# Patient Record
Sex: FEMALE | Race: WHITE | NOT HISPANIC OR LATINO | Employment: PART TIME | URBAN - METROPOLITAN AREA
[De-identification: names, ages, dates, MRNs, and addresses within clinical notes are randomized per-mention and may not be internally consistent; named-entity substitution may affect disease eponyms.]

---

## 2023-12-11 ENCOUNTER — HOSPITAL ENCOUNTER (EMERGENCY)
Facility: HOSPITAL | Age: 28
Discharge: HOME | End: 2023-12-11
Attending: EMERGENCY MEDICINE

## 2023-12-11 VITALS
OXYGEN SATURATION: 99 % | RESPIRATION RATE: 16 BRPM | WEIGHT: 120 LBS | DIASTOLIC BLOOD PRESSURE: 69 MMHG | TEMPERATURE: 98 F | SYSTOLIC BLOOD PRESSURE: 113 MMHG | BODY MASS INDEX: 23.56 KG/M2 | HEART RATE: 84 BPM | HEIGHT: 60 IN

## 2023-12-11 DIAGNOSIS — S80.12XA CONTUSION OF LEFT CALF, INITIAL ENCOUNTER: ICD-10-CM
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
## 2023-12-11 PROCEDURE — 99284 EMERGENCY DEPT VISIT MOD MDM: CPT

## 2023-12-11 RX ORDER — NAPROXEN 500 MG/1
500 TABLET ORAL 2 TIMES DAILY
Qty: 14 TABLET | Refills: 0 | Status: SHIPPED | OUTPATIENT
Start: 2023-12-11 | End: 2023-12-18

## 2023-12-11 RX ORDER — METHOCARBAMOL 500 MG/1
1000 TABLET, FILM COATED ORAL 3 TIMES DAILY
Qty: 42 TABLET | Refills: 0 | Status: SHIPPED | OUTPATIENT
Start: 2023-12-11 | End: 2023-12-18
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
## 2023-12-11 NOTE — DISCHARGE INSTRUCTIONS
RETURN TO EMERGENCY DEPARTMENT WITHOUT FAIL, IF YOUR SYMPTOMS WORSEN, IF YOU GET NEW OR DIFFERENT SYMPTOMS, IF YOU ARE UNABLE TO FOLLOW UP AS DIRECTED, OR IF YOU HAVE ANY CONCERNS OR WORRIES.    Take muscle relaxers and anti-inflammatories as directed.  Follow up with her primary care provider.

## 2024-03-04 ENCOUNTER — OFFICE VISIT (OUTPATIENT)
Dept: PSYCHIATRY | Facility: CLINIC | Age: 29
End: 2024-03-04
Payer: COMMERCIAL

## 2024-03-04 DIAGNOSIS — F31.32 BIPOLAR AFFECTIVE DISORDER, CURRENTLY DEPRESSED, MODERATE: ICD-10-CM

## 2024-03-04 DIAGNOSIS — F99 INSOMNIA DUE TO OTHER MENTAL DISORDER: ICD-10-CM

## 2024-03-04 DIAGNOSIS — F41.1 GAD (GENERALIZED ANXIETY DISORDER): Primary | ICD-10-CM

## 2024-03-04 DIAGNOSIS — F40.10 SOCIAL ANXIETY DISORDER: ICD-10-CM

## 2024-03-04 DIAGNOSIS — F43.21 GRIEF: ICD-10-CM

## 2024-03-04 DIAGNOSIS — F51.05 INSOMNIA DUE TO OTHER MENTAL DISORDER: ICD-10-CM

## 2024-03-04 PROCEDURE — G2211 COMPLEX E/M VISIT ADD ON: HCPCS | Mod: 95,,,

## 2024-03-04 PROCEDURE — 99215 OFFICE O/P EST HI 40 MIN: CPT | Mod: 95,,,

## 2024-03-04 RX ORDER — CLONAZEPAM 0.5 MG/1
0.5 TABLET ORAL DAILY
Qty: 30 TABLET | Refills: 0 | Status: SHIPPED | OUTPATIENT
Start: 2024-03-04 | End: 2024-04-08 | Stop reason: SDUPTHER

## 2024-03-04 NOTE — PROGRESS NOTES
"OUTPATIENT PSYCHIATRY FOLLOW UP VISIT    Encounter Date: 3/4/2024    Clinical Status of Patient:  Outpatient (Virtual)  The patient location is: [address] [place] [address]  The patient phone number is: [phone]   Visit type: Virtual visit with synchronous audio and video  Each patient to whom he or she provides medical services by telemedicine is:  (1) informed of the relationship between the practitioner and patient and the respective role of any other health care provider with respect to management of the patient; and (2) notified that he or she may decline to receive medical services by telemedicine and may withdraw from such care at any time.    Chief Complaint:  Asher Oliver is a 28 y.o. female who presents today for follow-up.  Met with patient.      HISTORY OF PRESENTING ILLNESS:  Asher Oliver is a 28 y.o. female with history of bipolar I disorder, ESTER, SAD, insomnia who presents for follow up appointment.      Plan at last appointment:  Continue escitalopram 20 mg daily for mood and anxiety, will begin taper to discontinuation after lamotrigine takes effect  Increase lamotrigine to 200 mg daily for mood stabilization  Referral previously placed for individual psychotherapy, patient is on waitlist    Psychotropic medication history:   Sertraline (ineffective)  Hydroxyzine (sedation)      INTERVAL HISTORY:    Pt reports her brother recently  by suicide. He was pronounced brain dead .She reports flashbacks and feelings of guilt, stating, "Why didn't I catch it" as the suicide occurred 1 week after her brother was released from behavioral hospitalization. She planned the  and spoke with REGINALDO about organ donation to save her parents the pain of doing these things. She reports after the organ donation and  she experienced daily panic attacks which have decreased in frequency to every few days currently.  She reports these panic attacks usually happen when she is home " "as her mind is kept busy at work and the attacks happen when her mind has time to wander.  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  She notes she has been having long periods of dissociation." She states, Before this my mood was stable. I wasn't having panic attacks, I had no issues sleeping."  She denies suicidal ideation, stating, I saw what my brother's suicide did to my parents and I could never do that to them." She requests p.r.n. medication she can take when experiencing a panic attack.  Hydroxyzine caused sedation in the past.    No interval episodes with symptoms consistent with connie or hypomania.  Denied interval or current suicidal/homicidal thoughts, intent, or plan or NSSI.  Denied other questions and concerns.    Medication side effects: None  Medication adherence: yes    PSYCHIATRIC REVIEW OF SYSTEMS:  Is patient experiencing or having changes in:  Trouble with sleep:  difficulty initiating and maintaining sleep, has to be exhausted or take nyquil   Appetite changes:  decreased  Weight changes:  has lost 4 lbs in the last month  Lack of energy:  fatigue  Anhedonia:  no  Somatic symptoms:  nausea  Anxiety/panic:  increased anxiety and panic attacks  Irritability: no  Guilty/hopeless:  +guilt, denies hopelessness  Concentration: no  Racing thoughts: no  Impulsive behaviors: no  Paranoia/AVH: no  Self-injurious behavior/risky behavior:  no  Any drugs:  no  Alcohol:  no    MEDICAL REVIEW OF SYSTEMS:   Pain: Denies any significant chronic or acute pain.  Constitutional: Denies fever or change in appetite.  Cardiovascular: Denies chest pain or exertional dyspnea.  Respiratory: Denies cough or orthopnea.   GI: Denies abdominal pain, N/V  Neurological: Denies tremor, seizure, or focal weakness.  Psychiatric: See HPI above.    PAST PSYCHIATRIC, MEDICAL, AND SOCIAL HISTORY REVIEWED  The patient's past medical, family and social history " have been reviewed and updated as appropriate within the electronic medical record - see encounter notes.    PAST MEDICAL HISTORY:   Past Medical History:   Diagnosis Date    Abnormal Pap smear of cervix     Allergy     Anxiety     Chronic back pain     Headache(784.0)     Spina bifida     Head trauma/Loss of consciousness: denies  Seizures: denies     PAST PSYCHIATRIC HISTORY:  Psychiatric Care (current & past): treatment via PCP in the past  Previous Psychiatric Diagnoses:  MDD, ESTER, SAD  Previous Psychiatric Hospitalizations: denies  Previous SI/HI:    during Covid, 1st few weeks on ICU; no attempts  Previous Suicide Attempts or NSSI: Cutting in adolescence   History of Psychotherapy: court ordered after parent's violent divorce  History of Violence: denies     FAMILY HISTORY:   Paternal: father bipolar and grandparents; etoh and opioid abuse;  suicide great grandfather   Maternal: mother bipolar disorder  and grandparents;  etoh and opioid abuse;    Siblings: brother hospitalized 2x for SI, ESTER, MDD,  by suicide 24     SOCIAL HISTORY:   Developmental/Childhood: younger childhood was good, at age 14 grandfather brain cancer and whole family fell apart; father RN in [place] during [place] he became an etohic and opioid addict; then mother zheng  Marital Status/Relationship Status: engaged to [facility], next year   Children: no, planning for next year  Resides/Housing Status: [place]   Occupation/Employment: RN - ICU at [employer]  Hobbies/Recreational Activities: reading, fantasy and sci fi books, going out with friends to festivals events, video games   Spirituality/Protestant: no  Education level: Associates degree    History: denies  Legal History: denies  Access to firearms: yes, for self defence, kept separate from ammo      SUBSTANCE USE HISTORY:  Caffeine: yes, trying to decrease use, tries to limit to 1 cup coffee and 1 energy drink daily  Tobacco: vape with nicotine  3%   Alcohol: occasionally  Other  "Substances: denies  Addiction/Rehab: denies    MEDICATIONS:    Current Outpatient Medications:     clonazePAM (KLONOPIN) 0.5 MG tablet, Take 1 tablet (0.5 mg total) by mouth once daily., Disp: 30 tablet, Rfl: 0    EScitalopram oxalate (LEXAPRO) 20 MG tablet, Take 1 tablet (20 mg total) by mouth once daily., Disp: 90 tablet, Rfl: 1    lamoTRIgine (LAMICTAL) 200 MG tablet, Take 1 tablet (200 mg total) by mouth once daily., Disp: 90 tablet, Rfl: 1    ALLERGIES:  Review of patient's allergies indicates:  No Known Allergies    EXAM:  Constitutional  Vitals:  Most recent vital signs were reviewed.   Last 3 sets of VS:      7/10/2023     3:10 PM 11/1/2023     9:03 AM 12/11/2023     3:54 PM   Vitals - 1 value per visit   SYSTOLIC 112 103 110   DIASTOLIC 64 66 68   Pulse  94 86   Temp   98.1 °F (36.7 °C)   Resp 16  16   SPO2   100 %   Weight (lb) 117.95 125.44 120   Weight (kg) 53.5 56.9 54.432   Height 5' 5" (1.651 m) 5' (1.524 m) 5' (1.524 m)   BMI (Calculated) 19.6 24.5 23.4   Pain Score Zero Zero       General:  unremarkable, age appropriate     Musculoskeletal  Muscle Strength/Tone:  No tremors appreciated   Gait & Station:  Unable to assess, Pt seated during virtual visit     Psychiatric  Speech:  no latency; no press   Mood & Affect:  anxious, depressed  congruent and tearful at times   Thought Process:  normal and logical   Associations:  intact   Thought Content:  normal, no suicidality, no homicidality, delusions, or paranoia   Insight:  intact   Judgement: behavior is adequate to circumstances   Orientation:  grossly intact   Memory: intact for content of interview   Language: grossly intact   Attention Span & Concentration:  Intact to content of interview   Fund of Knowledge:  Not tested     SUICIDE RISK ASSESSMENT:  Protective factors: age, gender, no prior attempts, no prior hospitalizations, no family h/o attempts, no ongoing substance abuse, no psychosis, engaged, denies SI/intent/plan, seeking treatment, " access to treatment, future oriented, good primary support  Risks:  History of MDD, ESTER, SAD, history of suicidal ideation, remote history of nonsuicidal self-injury in the form of cutting, access to firearms, brother's recent suicide  Patient is a low immediate and long-term risk considering risk factors    RELEVANT LABS/STUDIES:    Lab Results   Component Value Date    WBC 5.25 06/21/2023    HGB 14.6 06/21/2023    HCT 43.1 06/21/2023    MCV 89 06/21/2023     06/21/2023     BMP  Lab Results   Component Value Date     06/21/2023    K 4.8 06/21/2023     06/21/2023    CO2 24 06/21/2023    BUN 14 06/21/2023    CREATININE 0.7 06/21/2023    CALCIUM 9.6 06/21/2023    ANIONGAP 11 06/21/2023    ESTGFRAFRICA >60.0 07/24/2020    EGFRNONAA >60.0 07/24/2020     Lab Results   Component Value Date    ALT 39 06/21/2023     (H) 06/21/2023    ALKPHOS 76 06/21/2023    BILITOT 0.3 06/21/2023     Lab Results   Component Value Date    TSH 1.841 06/21/2023     Lab Results   Component Value Date    HGBA1C 4.7 06/21/2023     Lab Results   Component Value Date    CHOL 165 06/21/2023    TRIG 41 06/21/2023    HDL 66 06/21/2023    LDLCALC 90.8 06/21/2023    CHOLHDL 40.0 06/21/2023    TOTALCHOLEST 2.5 06/21/2023       IMPRESSION:    Asher Oliver is a 28 y.o. female with history of bipolar I disorder, ESTER, SAD, insomnia who presents for follow up appointment.    Status/Progress: Based on the examination today, the patient's problem(s) is/are improved and adequately but not ideally controlled.  New problems have not been presented today.   Co-morbidities are not complicating management of the primary condition.  There are no active rule-out diagnoses for this patient at this time.     Patient reports improvement in mood and anxiety after starting difficult titration of lamotrigine 1 month ago.  She is currently up to 100 mg daily.  Will continue typical titration.  Discussed taper of escitalopram as this is most  likely contributing to her symptoms of rapid cycling.  Patient verbalizes understanding.  We will begin taper next follow-up.    Patient reports worsening mood and marked anxiety with frequent panic attacks after her brother's death by suicide in January.  Discussed treatment options including augmenting with an agent such as aripiprazole.  Patient states she is leery of aripiprazole as this worsened her brother's depression.  She does not wish to start additional medication at this time would like to reassess at follow-up.  She does request p.r.n. medication for panic attacks    Risk Parameters:  Patient reports no suicidal ideation  Patient reports no homicidal ideation  Patient reports no self-injurious behavior  Patient reports no violent behavior    DIAGNOSES:    ICD-10-CM ICD-9-CM   1. ESTER (generalized anxiety disorder)  F41.1 300.02   2. Social anxiety disorder  F40.10 300.23   3. Insomnia due to other mental disorder  F51.05 300.9    F99 327.02   4. Bipolar affective disorder, currently depressed, moderate  F31.32 296.52   5. Grief  F43.21 309.0       PLAN:  Continue escitalopram 20 mg daily for mood and anxiety, (it was planned to begin taper to discontinuation after lamotrigine takes effect, however, with the patient's brother's recent death by suicide we will continue this medication at this time so as not to risk destabilization with medication changes)  Continue lamotrigine 200 mg daily for mood   Referral previously placed for individual psychotherapy, patient is on waitlist    RETURN TO CLINIC:   6 weeks      FELICITA Cronin, PMHNP-BC    50 minutes of total time spent on the encounter, which includes face to face time and non-face to face time preparing to see the patient (eg. review of tests), obtaining and/or reviewing separately obtained history, documenting clinical information in the electronic health record, independently interpreting results (not separately reported), and communicating  "results to the patient/family/caregiver, or care coordination (not separately reported).     Visit today included managing the longitudinal care of the patient due to the serious and/or complex managed problem(s) bipolar disorder, ESTER, social anxiety disorder, insomnia.    At this time there are no indications the patient represents an imminent danger to either themselves or others; will continue to manage treatment in the outpatient setting.    I discussed the patient's care with the patient including benefits, alternatives, possible adverse effects of the treatment plan; including the potential for metabolic complications, major organ dysfunction, black box warnings, and contraindications. The opportunity was given for questions/clarification, and after this discussion the above treatment plan was devised through shared decision making. The patient voiced their understanding of the diagnoses and treatments listed above and agreed to the treatment plan. Follow up plan was reviewed with the patient. The patient was advised to call to report any worsening of symptoms or problems with medication.    Supportive therapy and psychoeducation provided. Patient has been given crisis information including Suicide and Crisis Lifeline (call or text: 898). Patient also given instructions to go to the nearest ER or call 911 if unable to remain safe or if the Pt develops thoughts of harming self or others.    Documentation entered by me for this encounter may have been done in part using [employer] voice recognition transcription software. Garbled syntax, mangled pronouns, and other bizarre constructions may be attributed to that software system. Although I have made an effort to ensure accuracy, "sound like" errors may exist and should be interpreted in context.    "

## 2024-04-04 ENCOUNTER — TELEPHONE (OUTPATIENT)
Dept: PSYCHIATRY | Facility: CLINIC | Age: 29
End: 2024-04-04
Payer: COMMERCIAL

## 2024-04-08 ENCOUNTER — OFFICE VISIT (OUTPATIENT)
Dept: PSYCHIATRY | Facility: CLINIC | Age: 29
End: 2024-04-08
Payer: COMMERCIAL

## 2024-04-08 DIAGNOSIS — F40.10 SOCIAL ANXIETY DISORDER: ICD-10-CM

## 2024-04-08 DIAGNOSIS — F43.21 GRIEF: ICD-10-CM

## 2024-04-08 DIAGNOSIS — F41.1 GAD (GENERALIZED ANXIETY DISORDER): ICD-10-CM

## 2024-04-08 DIAGNOSIS — G47.00 INSOMNIA, UNSPECIFIED TYPE: ICD-10-CM

## 2024-04-08 DIAGNOSIS — F31.75 BIPOLAR DISORDER, IN PARTIAL REMISSION, MOST RECENT EPISODE DEPRESSED: Primary | ICD-10-CM

## 2024-04-08 PROCEDURE — 99215 OFFICE O/P EST HI 40 MIN: CPT | Mod: 95,,,

## 2024-04-08 PROCEDURE — 1160F RVW MEDS BY RX/DR IN RCRD: CPT | Mod: CPTII,95,,

## 2024-04-08 PROCEDURE — 1159F MED LIST DOCD IN RCRD: CPT | Mod: CPTII,95,,

## 2024-04-08 PROCEDURE — G2211 COMPLEX E/M VISIT ADD ON: HCPCS | Mod: 95,,,

## 2024-04-08 RX ORDER — CLONAZEPAM 0.5 MG/1
0.5 TABLET ORAL DAILY
Qty: 30 TABLET | Refills: 0 | Status: SHIPPED | OUTPATIENT
Start: 2024-04-08 | End: 2025-04-08

## 2024-04-08 RX ORDER — ESCITALOPRAM OXALATE 10 MG/1
15 TABLET ORAL DAILY
Qty: 135 TABLET | Refills: 0 | Status: SHIPPED | OUTPATIENT
Start: 2024-04-08 | End: 2024-07-07

## 2024-04-08 NOTE — PROGRESS NOTES
"OUTPATIENT PSYCHIATRY FOLLOW UP VISIT    Encounter Date: 2024    Clinical Status of Patient:  Outpatient (Virtual)  The patient location is: [address] [place] [address]  The patient phone number is: [phone]   Visit type: Virtual visit with synchronous audio and video  Each patient to whom he or she provides medical services by telemedicine is:  (1) informed of the relationship between the practitioner and patient and the respective role of any other health care provider with respect to management of the patient; and (2) notified that he or she may decline to receive medical services by telemedicine and may withdraw from such care at any time.    Chief Complaint:  Asher Oliver is a 28 y.o. female who presents today for follow-up.  Met with patient.      HISTORY OF PRESENTING ILLNESS:  Asher Oliver is a 28 y.o. female with history of bipolar I disorder, ESTER, SAD, insomnia who presents for follow up appointment.      3/4/2024: Pt reports her brother recently  by suicide. He was pronounced brain dead .She reports flashbacks and feelings of guilt, stating, "Why didn't I catch it" as the suicide occurred 1 week after her brother was released from behavioral hospitalization. She planned the  and spoke with REGINALDO about organ donation to save her parents the pain of doing these things. She reports after the organ donation and  she experienced daily panic attacks which have decreased in frequency to every few days currently.  She reports these panic attacks usually happen when she is home as her mind is kept busy at work and the attacks happen when her mind has time to wander.  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  She notes she has been having long periods of dissociation." She states, Before this my mood was stable. I wasn't having panic attacks, I had no issues sleeping."  She denies suicidal " "ideation, stating, I saw what my brother's suicide did to my parents and I could never do that to them." She requests p.r.n. medication she can take when experiencing a panic attack.  Hydroxyzine caused sedation in the past.    Plan at last appointment:  Continue escitalopram 20 mg daily for mood and anxiety, (it was planned to begin taper to discontinuation after lamotrigine takes effect, however, with the patient's brother's recent death by suicide we will continue this medication at this time so as not to risk destabilization with medication changes)  Continue lamotrigine 200 mg daily for mood   Start clonazepam 0.5 mg daily p.r.n. anxiety  Referral previously placed for individual psychotherapy, patient is on waitlist    Psychotropic medication history:   Sertraline (ineffective)  Hydroxyzine (sedation)      INTERVAL HISTORY:  Pt reports improvement in mood and states her mood has been stable. She reports, "Lamictal is wonderful." Anxiety has improved as well and frequency of panic attacks has decreased.  Patient reports she takes clonazepam p.r.n. and she does experience panic attacks and notes this relieves symptoms.  She notes it has been easier for her to go to work lately.  Her sleep has greatly improved.    No interval episodes with symptoms consistent with connie or hypomania.  Denied interval or current suicidal/homicidal thoughts, intent, or plan or NSSI.  Denied other questions and concerns.    Medication side effects: None  Medication adherence: yes    PSYCHIATRIC REVIEW OF SYSTEMS:  Is patient experiencing or having changes in:  Trouble with sleep:  sleeping much better, no longer waking up during the night  Appetite changes:  increased to baseline  Weight changes:   lost 5 lbs after her brother's death, no further weight loss  Lack of energy:  better, though some continued fatigue d/t working night shifts  Anhedonia:  no  Somatic symptoms:  no  Anxiety/panic: anxiety and panic attacks are well " controlled  Irritability: no  Guilty/hopeless:  some continued guilt, denies hopelessness  Concentration: no  Racing thoughts: no  Impulsive behaviors: no  Paranoia/AVH: no  Self-injurious behavior/risky behavior:  no  Any drugs:  no  Alcohol:  no    MEDICAL REVIEW OF SYSTEMS:   Pain: Denies any significant chronic or acute pain.  Constitutional: Denies fever or change in appetite.  Cardiovascular: Denies chest pain or exertional dyspnea.  Respiratory: Denies cough or orthopnea.   GI: Denies abdominal pain, N/V  Neurological: Denies tremor, seizure, or focal weakness.  Psychiatric: See HPI above.    PAST PSYCHIATRIC, MEDICAL, AND SOCIAL HISTORY REVIEWED  The patient's past medical, family and social history have been reviewed and updated as appropriate within the electronic medical record - see encounter notes.    PAST MEDICAL HISTORY:   Past Medical History:   Diagnosis Date    Abnormal Pap smear of cervix     Allergy     Anxiety     Chronic back pain     Headache(784.0)     Spina bifida     Head trauma/Loss of consciousness: denies  Seizures: denies     PAST PSYCHIATRIC HISTORY:  Psychiatric Care (current & past): treatment via PCP in the past  Previous Psychiatric Diagnoses:  MDD, ESTER, SAD  Previous Psychiatric Hospitalizations: denies  Previous SI/HI:    during Covid, 1st few weeks on ICU; no attempts  Previous Suicide Attempts or NSSI: Cutting in adolescence   History of Psychotherapy: court ordered after parent's violent divorce  History of Violence: denies     FAMILY HISTORY:   Paternal: father bipolar and grandparents; etoh and opioid abuse;  suicide great grandfather   Maternal: mother bipolar disorder  and grandparents;  etoh and opioid abuse;    Siblings: brother hospitalized 2x for SI, ESTER, MDD,  by suicide 24     SOCIAL HISTORY:   Developmental/Childhood: younger childhood was good, at age 14 grandfather brain cancer and whole family fell apart; father RN in [place] during [place] he became an  "etohic and opioid addict; then mother zheng  Marital Status/Relationship Status: engaged to BDF, next year   Children: no, planning for next year  Resides/Housing Status: [place]   Occupation/Employment: RN - ICU at [employer]  Hobbies/Recreational Activities: reading, fantasy and sci fi books, going out with friends to festivals events, video games   Spirituality/Anabaptism: no  Education level: Associates degree    History: denies  Legal History: denies  Access to firearms: yes, for self defence, kept separate from ammo      SUBSTANCE USE HISTORY:  Caffeine: yes, trying to decrease use, tries to limit to 1 cup coffee and 1 energy drink daily  Tobacco: vape with nicotine  3%   Alcohol: occasionally  Other Substances: denies  Addiction/Rehab: denies    MEDICATIONS:    Current Outpatient Medications:     clonazePAM (KLONOPIN) 0.5 MG tablet, Take 1 tablet (0.5 mg total) by mouth once daily., Disp: 30 tablet, Rfl: 0    EScitalopram oxalate (LEXAPRO) 10 MG tablet, Take 1.5 tablets (15 mg total) by mouth once daily., Disp: 135 tablet, Rfl: 0    lamoTRIgine (LAMICTAL) 200 MG tablet, Take 1 tablet (200 mg total) by mouth once daily., Disp: 90 tablet, Rfl: 1    ALLERGIES:  Review of patient's allergies indicates:  No Known Allergies    EXAM:  Constitutional  Vitals:  Most recent vital signs were reviewed.   Last 3 sets of VS:      7/10/2023     3:10 PM 11/1/2023     9:03 AM 12/11/2023     3:54 PM   Vitals - 1 value per visit   SYSTOLIC 112 103 110   DIASTOLIC 64 66 68   Pulse  94 86   Temp   98.1 °F (36.7 °C)   Resp 16  16   SPO2   100 %   Weight (lb) 117.95 125.44 120   Weight (kg) 53.5 56.9 54.432   Height 5' 5" (1.651 m) 5' (1.524 m) 5' (1.524 m)   BMI (Calculated) 19.6 24.5 23.4   Pain Score Zero Zero       General:  unremarkable, age appropriate     Musculoskeletal  Muscle Strength/Tone:  No tremors appreciated   Gait & Station:  Unable to assess, Pt seated during virtual visit     Psychiatric  Speech:  no latency; no " press   Mood & Affect:  sad  congruent and appropriate   Thought Process:  normal and logical   Associations:  intact   Thought Content:  normal, no suicidality, no homicidality, delusions, or paranoia   Insight:  intact   Judgement: behavior is adequate to circumstances   Orientation:  grossly intact   Memory: intact for content of interview   Language: grossly intact   Attention Span & Concentration:  Intact to content of interview   Fund of Knowledge:  Not tested     SUICIDE RISK ASSESSMENT:  Protective factors: age, gender, no prior attempts, no prior hospitalizations, no family h/o attempts, no ongoing substance abuse, no psychosis, engaged, denies SI/intent/plan, seeking treatment, access to treatment, future oriented, good primary support  Risks:  History of MDD, ESTER, SAD, history of suicidal ideation, remote history of nonsuicidal self-injury in the form of cutting, access to firearms, brother's recent suicide  Patient is a low immediate and long-term risk considering risk factors    RELEVANT LABS/STUDIES:    Lab Results   Component Value Date    WBC 5.25 06/21/2023    HGB 14.6 06/21/2023    HCT 43.1 06/21/2023    MCV 89 06/21/2023     06/21/2023     BMP  Lab Results   Component Value Date     06/21/2023    K 4.8 06/21/2023     06/21/2023    CO2 24 06/21/2023    BUN 14 06/21/2023    CREATININE 0.7 06/21/2023    CALCIUM 9.6 06/21/2023    ANIONGAP 11 06/21/2023    ESTGFRAFRICA >60.0 07/24/2020    EGFRNONAA >60.0 07/24/2020     Lab Results   Component Value Date    ALT 39 06/21/2023     (H) 06/21/2023    ALKPHOS 76 06/21/2023    BILITOT 0.3 06/21/2023     Lab Results   Component Value Date    TSH 1.841 06/21/2023     Lab Results   Component Value Date    HGBA1C 4.7 06/21/2023     Lab Results   Component Value Date    CHOL 165 06/21/2023    TRIG 41 06/21/2023    HDL 66 06/21/2023    LDLCALC 90.8 06/21/2023    CHOLHDL 40.0 06/21/2023    TOTALCHOLEST 2.5 06/21/2023       IMPRESSION:     Asher Oliver is a 28 y.o. female with history of bipolar I disorder, ESTER, SAD, insomnia who presents for follow up appointment.    Status/Progress: Based on the examination today, the patient's problem(s) is/are improved and adequately but not ideally controlled.  New problems have not been presented today.   Co-morbidities are not complicating management of the primary condition.  There are no active rule-out diagnoses for this patient at this time.     Patient reports improvement in mood and anxiety after starting difficult titration of lamotrigine 1 month ago.  She is currently up to 100 mg daily.  Will continue typical titration.  Discussed taper of escitalopram as this is most likely contributing to her symptoms of rapid cycling.  Patient verbalizes understanding.  We will begin taper next follow-up.    Patient reports worsening mood and marked anxiety with frequent panic attacks after her brother's death by suicide in January.  Discussed treatment options including augmenting with an agent such as aripiprazole.  Patient states she is leery of aripiprazole as this worsened her brother's depression.  She does not wish to start additional medication at this time would like to reassess at follow-up.  She does request p.r.n. medication for panic attacks    Risk Parameters:  Patient reports no suicidal ideation  Patient reports no homicidal ideation  Patient reports no self-injurious behavior  Patient reports no violent behavior    DIAGNOSES:    ICD-10-CM ICD-9-CM   1. Bipolar disorder, in partial remission, most recent episode depressed  F31.75 296.55   2. ESTER (generalized anxiety disorder)  F41.1 300.02   3. Social anxiety disorder  F40.10 300.23   4. Grief  F43.21 309.0   5. Insomnia, unspecified type  G47.00 780.52       PLAN:  Decrease escitalopram from 20 to 15 mg daily with plan to taper to discontinuation as antidepressants have been shown to cause rapid cycling in bipolar disorder  Continue  lamotrigine 200 mg daily for mood   Continue clonazepam 0.5 mg daily p.r.n. anxiety  Referral previously placed for individual psychotherapy, patient is on waitlist    RETURN TO CLINIC:   6 weeks      FELICITA Cronin, PMHNP-BC    40 minutes of total time spent on the encounter, which includes face to face time and non-face to face time preparing to see the patient (eg. review of tests), obtaining and/or reviewing separately obtained history, documenting clinical information in the electronic health record, independently interpreting results (not separately reported), and communicating results to the patient/family/caregiver, or care coordination (not separately reported).     Visit today included managing the longitudinal care of the patient due to the serious and/or complex managed problem(s) bipolar disorder, ESTER, social anxiety disorder, insomnia.    At this time there are no indications the patient represents an imminent danger to either themselves or others; will continue to manage treatment in the outpatient setting.    I discussed the patient's care with the patient including benefits, alternatives, possible adverse effects of the treatment plan; including the potential for metabolic complications, major organ dysfunction, black box warnings, and contraindications. The opportunity was given for questions/clarification, and after this discussion the above treatment plan was devised through shared decision making. The patient voiced their understanding of the diagnoses and treatments listed above and agreed to the treatment plan. Follow up plan was reviewed with the patient. The patient was advised to call to report any worsening of symptoms or problems with medication.    Supportive therapy and psychoeducation provided. Patient has been given crisis information including Suicide and Crisis Lifeline (call or text: 418). Patient also given instructions to go to the nearest ER or call 911 if unable to  "remain safe or if the Pt develops thoughts of harming self or others.    Documentation entered by me for this encounter may have been done in part using [employer] voice recognition transcription software. Garbled syntax, mangled pronouns, and other bizarre constructions may be attributed to that software system. Although I have made an effort to ensure accuracy, "sound like" errors may exist and should be interpreted in context.    "

## 2024-04-15 ENCOUNTER — PATIENT MESSAGE (OUTPATIENT)
Dept: PSYCHIATRY | Facility: CLINIC | Age: 29
End: 2024-04-15
Payer: COMMERCIAL

## 2024-05-11 ENCOUNTER — PATIENT MESSAGE (OUTPATIENT)
Dept: PSYCHIATRY | Facility: CLINIC | Age: 29
End: 2024-05-11
Payer: COMMERCIAL

## 2024-06-06 ENCOUNTER — TELEPHONE (OUTPATIENT)
Dept: FAMILY MEDICINE | Facility: CLINIC | Age: 29
End: 2024-06-06
Payer: COMMERCIAL

## 2024-06-06 ENCOUNTER — OFFICE VISIT (OUTPATIENT)
Dept: FAMILY MEDICINE | Facility: CLINIC | Age: 29
End: 2024-06-06
Payer: COMMERCIAL

## 2024-06-06 VITALS
OXYGEN SATURATION: 98 % | WEIGHT: 124.13 LBS | BODY MASS INDEX: 24.37 KG/M2 | DIASTOLIC BLOOD PRESSURE: 70 MMHG | HEIGHT: 60 IN | SYSTOLIC BLOOD PRESSURE: 100 MMHG | HEART RATE: 85 BPM | RESPIRATION RATE: 16 BRPM

## 2024-06-06 DIAGNOSIS — Z00.00 PHYSICAL EXAM: Primary | ICD-10-CM

## 2024-06-06 PROCEDURE — 99395 PREV VISIT EST AGE 18-39: CPT | Mod: S$GLB,,,

## 2024-06-06 PROCEDURE — 1160F RVW MEDS BY RX/DR IN RCRD: CPT | Mod: CPTII,S$GLB,,

## 2024-06-06 PROCEDURE — 3078F DIAST BP <80 MM HG: CPT | Mod: CPTII,S$GLB,,

## 2024-06-06 PROCEDURE — 3074F SYST BP LT 130 MM HG: CPT | Mod: CPTII,S$GLB,,

## 2024-06-06 PROCEDURE — 1159F MED LIST DOCD IN RCRD: CPT | Mod: CPTII,S$GLB,,

## 2024-06-06 PROCEDURE — 3008F BODY MASS INDEX DOCD: CPT | Mod: CPTII,S$GLB,,

## 2024-06-06 PROCEDURE — 99999 PR PBB SHADOW E&M-EST. PATIENT-LVL IV: CPT | Mod: PBBFAC,,,

## 2024-06-06 NOTE — TELEPHONE ENCOUNTER
Spoke with patient & made aware that Dr. Chaudhary hasn't seen her since 2020. We would need an appointment to place orders. She is needing TB test for work before Monday (soonest appt w/Dr. Chaudhary). Appt made w/BRADEN to address need. States she will schedule annual w/Dr. Chaudhary soon for annual.

## 2024-06-06 NOTE — TELEPHONE ENCOUNTER
----- Message from Miguel Meyer sent at 6/6/2024  7:05 AM CDT -----  Regarding: Lab Request  Caller is requesting to schedule their Lab appointment prior to annual appointment.  Order is not listed in EPIC.  Please enter order and contact patient to schedule.    Name of Caller:Pt    Preferred Date and Time of Labs:asap    Where would they like the lab performed?[facility]    Would the patient rather a call back or a response via [facility]? Call back    Best Call Back Number:[phone]      Additional Information:Sts she needs to get the TB Gold lab also wants a call back about her vaccination records.     Please advise -- Thank you

## 2024-06-07 ENCOUNTER — LAB VISIT (OUTPATIENT)
Dept: LAB | Facility: HOSPITAL | Age: 29
End: 2024-06-07
Payer: COMMERCIAL

## 2024-06-07 DIAGNOSIS — Z00.00 PHYSICAL EXAM: ICD-10-CM

## 2024-06-07 PROCEDURE — 86480 TB TEST CELL IMMUN MEASURE: CPT

## 2024-06-10 ENCOUNTER — PATIENT MESSAGE (OUTPATIENT)
Dept: FAMILY MEDICINE | Facility: CLINIC | Age: 29
End: 2024-06-10
Payer: COMMERCIAL

## 2024-06-10 LAB
GAMMA INTERFERON BACKGROUND BLD IA-ACNC: 0.06 IU/ML
M TB IFN-G CD4+ BCKGRND COR BLD-ACNC: 0.01 IU/ML
M TB IFN-G CD4+ BCKGRND COR BLD-ACNC: 0.04 IU/ML
MITOGEN IGNF BCKGRD COR BLD-ACNC: 9.94 IU/ML
TB GOLD PLUS: NEGATIVE

## 2024-07-11 DIAGNOSIS — F31.71 BIPOLAR DISORDER, IN PARTIAL REMISSION, MOST RECENT EPISODE HYPOMANIC: ICD-10-CM

## 2024-07-11 RX ORDER — LAMOTRIGINE 200 MG/1
200 TABLET ORAL
Qty: 30 TABLET | Refills: 0 | Status: SHIPPED | OUTPATIENT
Start: 2024-07-11 | End: 2024-07-12

## 2024-07-12 RX ORDER — LAMOTRIGINE 200 MG/1
200 TABLET ORAL DAILY
Qty: 30 TABLET | Refills: 0 | Status: SHIPPED | OUTPATIENT
Start: 2024-07-12

## 2024-08-06 ENCOUNTER — OFFICE VISIT (OUTPATIENT)
Dept: FAMILY MEDICINE | Facility: CLINIC | Age: 29
End: 2024-08-06
Payer: COMMERCIAL

## 2024-08-06 ENCOUNTER — HOSPITAL ENCOUNTER (OUTPATIENT)
Dept: RADIOLOGY | Facility: CLINIC | Age: 29
Discharge: HOME | End: 2024-08-06
Payer: COMMERCIAL

## 2024-08-06 VITALS
DIASTOLIC BLOOD PRESSURE: 60 MMHG | OXYGEN SATURATION: 95 % | HEART RATE: 97 BPM | HEIGHT: 61 IN | SYSTOLIC BLOOD PRESSURE: 110 MMHG | WEIGHT: 129.19 LBS | BODY MASS INDEX: 24.39 KG/M2 | TEMPERATURE: 98 F

## 2024-08-06 DIAGNOSIS — M25.562 ACUTE PAIN OF LEFT KNEE: Primary | ICD-10-CM

## 2024-08-06 DIAGNOSIS — M25.562 ACUTE PAIN OF LEFT KNEE: ICD-10-CM

## 2024-08-06 PROCEDURE — 99999 PR PBB SHADOW E&M-EST. PATIENT-LVL V: CPT | Mod: PBBFAC,,,

## 2024-08-06 PROCEDURE — 1159F MED LIST DOCD IN RCRD: CPT | Mod: CPTII,S$GLB,,

## 2024-08-06 PROCEDURE — 3008F BODY MASS INDEX DOCD: CPT | Mod: CPTII,S$GLB,,

## 2024-08-06 PROCEDURE — 3078F DIAST BP <80 MM HG: CPT | Mod: CPTII,S$GLB,,

## 2024-08-06 PROCEDURE — 73562 X-RAY EXAM OF KNEE 3: CPT | Mod: TC,FY,PO,LT

## 2024-08-06 PROCEDURE — 73562 X-RAY EXAM OF KNEE 3: CPT | Mod: 26,LT,S$GLB, | Performed by: RADIOLOGY

## 2024-08-06 PROCEDURE — 99213 OFFICE O/P EST LOW 20 MIN: CPT | Mod: S$GLB,,,

## 2024-08-06 PROCEDURE — 1160F RVW MEDS BY RX/DR IN RCRD: CPT | Mod: CPTII,S$GLB,,

## 2024-08-06 PROCEDURE — 3074F SYST BP LT 130 MM HG: CPT | Mod: CPTII,S$GLB,,

## 2024-08-07 ENCOUNTER — OFFICE VISIT (OUTPATIENT)
Dept: ORTHOPEDICS | Facility: CLINIC | Age: 29
End: 2024-08-07
Payer: COMMERCIAL

## 2024-08-07 ENCOUNTER — TELEPHONE (OUTPATIENT)
Dept: FAMILY MEDICINE | Facility: CLINIC | Age: 29
End: 2024-08-07
Payer: COMMERCIAL

## 2024-08-07 VITALS — WEIGHT: 129 LBS | BODY MASS INDEX: 24.35 KG/M2 | HEIGHT: 61 IN

## 2024-08-07 DIAGNOSIS — M25.562 ACUTE PAIN OF LEFT KNEE: ICD-10-CM

## 2024-08-07 PROCEDURE — 1160F RVW MEDS BY RX/DR IN RCRD: CPT | Mod: CPTII,S$GLB,, | Performed by: ORTHOPAEDIC SURGERY

## 2024-08-07 PROCEDURE — 1159F MED LIST DOCD IN RCRD: CPT | Mod: CPTII,S$GLB,, | Performed by: ORTHOPAEDIC SURGERY

## 2024-08-07 PROCEDURE — 3008F BODY MASS INDEX DOCD: CPT | Mod: CPTII,S$GLB,, | Performed by: ORTHOPAEDIC SURGERY

## 2024-08-07 PROCEDURE — 99999 PR PBB SHADOW E&M-EST. PATIENT-LVL III: CPT | Mod: PBBFAC,,, | Performed by: ORTHOPAEDIC SURGERY

## 2024-08-07 PROCEDURE — 99205 OFFICE O/P NEW HI 60 MIN: CPT | Mod: S$GLB,,, | Performed by: ORTHOPAEDIC SURGERY

## 2024-09-12 DIAGNOSIS — F31.71 BIPOLAR DISORDER, IN PARTIAL REMISSION, MOST RECENT EPISODE HYPOMANIC: ICD-10-CM

## 2024-09-12 DIAGNOSIS — F41.1 GAD (GENERALIZED ANXIETY DISORDER): ICD-10-CM

## 2024-09-12 DIAGNOSIS — F40.10 SOCIAL ANXIETY DISORDER: ICD-10-CM

## 2024-09-13 RX ORDER — LAMOTRIGINE 200 MG/1
200 TABLET ORAL DAILY
Qty: 30 TABLET | Refills: 0 | Status: SHIPPED | OUTPATIENT
Start: 2024-09-13

## 2024-09-13 RX ORDER — ESCITALOPRAM OXALATE 20 MG/1
20 TABLET ORAL DAILY
Qty: 30 TABLET | Refills: 0 | Status: SHIPPED | OUTPATIENT
Start: 2024-09-13 | End: 2024-12-12

## 2024-09-13 NOTE — TELEPHONE ENCOUNTER
LOV: 04/08/2024  NOV: RETURN TO CLINIC:   6 weeks   LDO: Lexapro 10 MG     Patient Comment: I need this dosage increased to one 20mg tablet daily     LOV: 04/08/2024  NOV: RETURN TO CLINIC:   6 weeks   LDO: Lamictal 200 MG

## 2024-10-23 DIAGNOSIS — F41.1 GAD (GENERALIZED ANXIETY DISORDER): ICD-10-CM

## 2024-10-23 DIAGNOSIS — F31.71 BIPOLAR DISORDER, IN PARTIAL REMISSION, MOST RECENT EPISODE HYPOMANIC: ICD-10-CM

## 2024-10-23 DIAGNOSIS — F40.10 SOCIAL ANXIETY DISORDER: ICD-10-CM

## 2024-10-28 RX ORDER — ESCITALOPRAM OXALATE 20 MG/1
20 TABLET ORAL
Qty: 30 TABLET | Refills: 0 | Status: SHIPPED | OUTPATIENT
Start: 2024-10-28

## 2024-10-28 RX ORDER — LAMOTRIGINE 200 MG/1
200 TABLET ORAL
Qty: 30 TABLET | Refills: 0 | Status: SHIPPED | OUTPATIENT
Start: 2024-10-28

## 2024-11-06 ENCOUNTER — OFFICE VISIT (OUTPATIENT)
Dept: PSYCHIATRY | Facility: CLINIC | Age: 29
End: 2024-11-06
Payer: COMMERCIAL

## 2024-11-06 DIAGNOSIS — F31.70 BIPOLAR AFFECTIVE DISORDER IN REMISSION: Primary | ICD-10-CM

## 2024-11-06 DIAGNOSIS — F41.1 GAD (GENERALIZED ANXIETY DISORDER): ICD-10-CM

## 2024-11-06 DIAGNOSIS — F40.10 SOCIAL ANXIETY DISORDER: ICD-10-CM

## 2024-11-06 PROCEDURE — 1160F RVW MEDS BY RX/DR IN RCRD: CPT | Mod: CPTII,95,,

## 2024-11-06 PROCEDURE — 99214 OFFICE O/P EST MOD 30 MIN: CPT | Mod: 95,,,

## 2024-11-06 PROCEDURE — G2211 COMPLEX E/M VISIT ADD ON: HCPCS | Mod: 95,,,

## 2024-11-06 PROCEDURE — 1159F MED LIST DOCD IN RCRD: CPT | Mod: CPTII,95,,

## 2024-11-06 RX ORDER — LAMOTRIGINE 200 MG/1
200 TABLET ORAL DAILY
Qty: 90 TABLET | Refills: 0 | Status: SHIPPED | OUTPATIENT
Start: 2024-11-06

## 2024-11-06 RX ORDER — ESCITALOPRAM OXALATE 20 MG/1
20 TABLET ORAL DAILY
Qty: 90 TABLET | Refills: 0 | Status: SHIPPED | OUTPATIENT
Start: 2024-11-06

## 2024-11-06 NOTE — PROGRESS NOTES
OUTPATIENT PSYCHIATRY FOLLOW UP VISIT    Encounter Date: 11/6/2024    Clinical Status of Patient:  Outpatient (Virtual)  The patient location is: [address] [place] [address]  The patient phone number is: [phone]   Visit type: Virtual visit with synchronous audio and video  Each patient to whom he or she provides medical services by telemedicine is:  (1) informed of the relationship between the practitioner and patient and the respective role of any other health care provider with respect to management of the patient; and (2) notified that he or she may decline to receive medical services by telemedicine and may withdraw from such care at any time.    Chief Complaint:  Asher Saldana is a 29 y.o. female who presents today for follow-up.  Met with patient.      HISTORY OF PRESENTING ILLNESS:  Asher Saldana is a 29 y.o. female with history of bipolar I disorder, ESTER, SAD, insomnia who presents for follow up appointment.      Plan at last appointment:  Decrease escitalopram from 20 to 15 mg daily with plan to taper to discontinuation as antidepressants have been shown to cause rapid cycling in bipolar disorder  Continue lamotrigine 200 mg daily for mood   Continue clonazepam 0.5 mg daily p.r.n. anxiety  Referral previously placed for individual psychotherapy, patient is on waitlist    Psychotropic medication history:   Sertraline (ineffective)  Hydroxyzine (sedation)      INTERVAL HISTORY:    Escitalopram was decreased from 20-15 mg daily at last visit, however, shortly after the decrease in dose patient experienced flashbacks and a marked increase in anxiety so escitalopram was increased to original dose of 20 mg daily.    Today, Pt tells me she is doing well overall. Her mood has been stable.     Generalized anxiety is well controlled. She does report she has had approximately one panic attack per month. Clonazepam 0.5 mg aborts these attacks.    Is patient experiencing or having  "changes in:  Trouble with sleep:  sleeping well, recently changed to day shift which allows for a more normal sleep cycle  Appetite changes:  "up and down. Some days I eat normally and other days I only have coffee."  Weight changes:  5 lb gain since January  Lack of energy:  no  Anhedonia:  no  Somatic symptoms:  no  Irritability: no  Guilty/hopeless: no  Concentration: no  Racing thoughts: no  Impulsive behaviors: no  Paranoia/AVH: no  Self-injurious behavior/risky behavior:  no  Any drugs:  no  Alcohol:  no    No interval episodes with symptoms consistent with connie or hypomania.  Denied interval or current suicidal/homicidal thoughts, intent, or plan or NSSI.  Denied other questions and concerns.    Medication side effects: None  Medication adherence: yes    MEDICAL REVIEW OF SYSTEMS:   Pain: Denies any significant chronic or acute pain.  Constitutional: Denies fever or change in appetite.  Cardiovascular: Denies chest pain or exertional dyspnea.  Respiratory: Denies cough or orthopnea.   GI: Denies abdominal pain, N/V  Neurological: Denies tremor, seizure, or focal weakness.  Psychiatric: See HPI above.    PAST PSYCHIATRIC, MEDICAL, AND SOCIAL HISTORY REVIEWED  The patient's past medical, family and social history have been reviewed and updated as appropriate within the electronic medical record - see encounter notes.    PAST MEDICAL HISTORY:   Past Medical History:   Diagnosis Date    Abnormal Pap smear of cervix     Allergy     Anxiety     Chronic back pain     Headache(784.0)     Spina bifida     Head trauma/Loss of consciousness: denies  Seizures: denies     PAST PSYCHIATRIC HISTORY:  Psychiatric Care (current & past): treatment via PCP in the past  Previous Psychiatric Diagnoses:  MDD, ESTER, SAD  Previous Psychiatric Hospitalizations: denies  Previous SI/HI:    during Covid, 1st few weeks on ICU; no attempts  Previous Suicide Attempts or NSSI: Cutting in adolescence   History of Psychotherapy: court ordered " "after parent's violent divorce  History of Violence: denies     FAMILY HISTORY:   Paternal: father bipolar and grandparents; etoh and opioid abuse;  suicide great grandfather   Maternal: mother bipolar disorder  and grandparents;  etoh and opioid abuse;    Siblings: brother hospitalized 2x for SI, ESTER, MDD,  by suicide 24     SOCIAL HISTORY:   Developmental/Childhood: younger childhood was good, at age 14 grandfather brain cancer and whole family fell apart; father RN in [place] during [place] he became an etohic and opioid addict; then mother zheng  Marital Status/Relationship Status: engaged to BD, next year   Children: no, planning for next year  Resides/Housing Status: [place]   Occupation/Employment: RN - ICU at [employer]  Hobbies/Recreational Activities: reading, fantasy and sci fi books, going out with friends to festivals events, video games   Spirituality/Anabaptism: no  Education level: Associates degree    History: denies  Legal History: denies  Access to firearms: yes, for self defence, kept separate from ammo      SUBSTANCE USE HISTORY:  Caffeine: yes, trying to decrease use, tries to limit to 1 cup coffee and 1 energy drink daily  Tobacco: vape with nicotine  3%   Alcohol: occasionally  Other Substances: denies  Addiction/Rehab: denies    EXAM:  Constitutional  Vitals:  Most recent vital signs were reviewed.   Last 3 sets of VS:      2024     2:57 PM 2024    11:22 AM 2024    10:29 AM   Vitals - 1 value per visit   SYSTOLIC 100 110    DIASTOLIC 70 60    Pulse 85 97    Temp  98.1 °F (36.7 °C)    Resp 16     SPO2 98 % 95 %    Weight (lb) 124.12 129.19 129   Weight (kg) 56.3 58.6 58.514   Height 5' (1.524 m) 5' 1" (1.549 m) 5' 1" (1.549 m)   BMI (Calculated) 24.2 24.4 24.4   Pain Score Zero Five Four      General:  unremarkable, age appropriate     Musculoskeletal  Muscle Strength/Tone:  No tremors appreciated   Gait & Station:  Pt seated during virtual visit     Psychiatric  Speech:  no " latency; no press   Mood & Affect:  euthymic  congruent and appropriate   Thought Process:  normal and logical   Associations:  intact   Thought Content:  normal, no suicidality, no homicidality, delusions, or paranoia   Insight:  intact   Judgement: behavior is adequate to circumstances   Orientation:  grossly intact   Memory: intact for content of interview   Language: grossly intact   Attention Span & Concentration:  Intact to content of interview   Fund of Knowledge:  Not tested     SUICIDE RISK ASSESSMENT:  Protective factors: age, gender, no prior attempts, no prior hospitalizations, no family h/o attempts, no ongoing substance abuse, no psychosis, engaged, denies SI/intent/plan, seeking treatment, access to treatment, future oriented, good primary support  Risks:  History of MDD, ESTER, SAD, history of suicidal ideation, remote history of nonsuicidal self-injury in the form of cutting, access to firearms, brother's suicide  Patient is a low immediate and long-term risk considering risk factors    RELEVANT LABS/STUDIES:    Lab Results   Component Value Date    WBC 5.25 06/21/2023    HGB 14.6 06/21/2023    HCT 43.1 06/21/2023    MCV 89 06/21/2023     06/21/2023     BMP  Lab Results   Component Value Date     06/21/2023    K 4.8 06/21/2023     06/21/2023    CO2 24 06/21/2023    BUN 14 06/21/2023    CREATININE 0.7 06/21/2023    CALCIUM 9.6 06/21/2023    ANIONGAP 11 06/21/2023    ESTGFRAFRICA >60.0 07/24/2020    EGFRNONAA >60.0 07/24/2020     Lab Results   Component Value Date    ALT 39 06/21/2023     (H) 06/21/2023    ALKPHOS 76 06/21/2023    BILITOT 0.3 06/21/2023     Lab Results   Component Value Date    TSH 1.841 06/21/2023     Lab Results   Component Value Date    HGBA1C 4.7 06/21/2023     Lab Results   Component Value Date    CHOL 165 06/21/2023    TRIG 41 06/21/2023    HDL 66 06/21/2023    LDLCALC 90.8 06/21/2023    CHOLHDL 40.0 06/21/2023    TOTALCHOLEST 2.5 06/21/2023        IMPRESSION:    Asher Saldana is a 29 y.o. female with history of bipolar I disorder, ESTER, SAD, insomnia who presents for follow up appointment.    Status/Progress: Based on the examination today, the patient's problem(s) is/are well controlled.  New problems have not been presented today.   Co-morbidities are not complicating management of the primary condition.  There are no active rule-out diagnoses for this patient at this time.     Patient reports improvement in mood and anxiety after starting difficult titration of lamotrigine 1 month ago.  She is currently up to 100 mg daily.  Will continue typical titration.  Discussed taper of escitalopram as this is most likely contributing to her symptoms of rapid cycling.  Patient verbalizes understanding.  We will begin taper next follow-up.    Patient reports worsening mood and marked anxiety with frequent panic attacks after her brother's death by suicide in January.  Discussed treatment options including augmenting with an agent such as aripiprazole.  Patient states she is leery of aripiprazole as this worsened her brother's depression.  She does not wish to start additional medication at this time would like to reassess at follow-up.  She does request p.r.n. medication for panic attacks    Risk Parameters:  Patient reports no suicidal ideation  Patient reports no homicidal ideation  Patient reports no self-injurious behavior  Patient reports no violent behavior    DIAGNOSES:    ICD-10-CM ICD-9-CM   1. Bipolar affective disorder in remission  F31.70 296.80   2. ESTER (generalized anxiety disorder)  F41.1 300.02   3. Social anxiety disorder  F40.10 300.23       PLAN:  Continue escitalopram 20 mg daily for mood and anxiety  Continue lamotrigine 200 mg daily for mood   Continue clonazepam 0.5 mg daily p.r.n. anxiety  Referral previously placed for individual psychotherapy, patient is on waitlist    RETURN TO CLINIC:   3 months      FELICITA Cronin,  PMHNP-BC    35 minutes of total time spent on the encounter, which includes face to face time and non-face to face time preparing to see the patient (eg. review of tests), obtaining and/or reviewing separately obtained history, documenting clinical information in the electronic health record, independently interpreting results (not separately reported), and communicating results to the patient/family/caregiver, or care coordination (not separately reported).     Visit today included managing the longitudinal care of the patient due to the serious and/or complex managed problem(s) bipolar disorder, ESTER, social anxiety disorder, insomnia.    At this time there are no indications the patient represents an imminent danger to either themselves or others; will continue to manage treatment in the outpatient setting.    I discussed the patient's care with the patient including benefits, alternatives, possible adverse effects of the treatment plan; including the potential for metabolic complications, major organ dysfunction, black box warnings, and contraindications. The opportunity was given for questions/clarification, and after this discussion the above treatment plan was devised through shared decision making. The patient voiced their understanding of the diagnoses and treatments listed above and agreed to the treatment plan. Follow up plan was reviewed with the patient. The patient was advised to call to report any worsening of symptoms or problems with medication.    Supportive therapy and psychoeducation provided. Patient has been given crisis information including Suicide and Crisis Lifeline (call or text: 296). Patient also given instructions to go to the nearest ER or call 911 if unable to remain safe or if the Pt develops thoughts of harming self or others.    Documentation entered by me for this encounter may have been done in part using [employer] Fluency Direct voice recognition transcription software. Sydnee  "syntax, mangled pronouns, and other bizarre constructions may be attributed to that software system. Although I have made an effort to ensure accuracy, "sound like" errors may exist and should be interpreted in context.  "

## 2024-12-02 ENCOUNTER — TELEPHONE (OUTPATIENT)
Dept: OBSTETRICS AND GYNECOLOGY | Facility: CLINIC | Age: 29
End: 2024-12-02
Payer: COMMERCIAL

## 2024-12-02 NOTE — TELEPHONE ENCOUNTER
TC/w pt. Regarding scheduling an appt, per pt's request. Successfully scheduled appt with KAMRON Rojo, on  12/6/2024 at 3:30 pm. Reason for visit: Pregnancy Confirmation. Pt voiced understanding.

## 2024-12-06 ENCOUNTER — OFFICE VISIT (OUTPATIENT)
Dept: OBSTETRICS AND GYNECOLOGY | Facility: CLINIC | Age: 29
End: 2024-12-06
Payer: COMMERCIAL

## 2024-12-06 VITALS
DIASTOLIC BLOOD PRESSURE: 70 MMHG | BODY MASS INDEX: 24.58 KG/M2 | SYSTOLIC BLOOD PRESSURE: 110 MMHG | WEIGHT: 130.06 LBS

## 2024-12-06 DIAGNOSIS — Z32.00 ENCOUNTER FOR CONFIRMATION OF PREGNANCY TEST RESULT WITH PHYSICAL EXAMINATION: Primary | ICD-10-CM

## 2024-12-06 PROCEDURE — 99999 PR PBB SHADOW E&M-EST. PATIENT-LVL III: CPT | Mod: PBBFAC,,, | Performed by: FAMILY MEDICINE

## 2024-12-06 PROCEDURE — 1159F MED LIST DOCD IN RCRD: CPT | Mod: CPTII,S$GLB,, | Performed by: FAMILY MEDICINE

## 2024-12-06 PROCEDURE — 87086 URINE CULTURE/COLONY COUNT: CPT | Performed by: FAMILY MEDICINE

## 2024-12-06 PROCEDURE — 3078F DIAST BP <80 MM HG: CPT | Mod: CPTII,S$GLB,, | Performed by: FAMILY MEDICINE

## 2024-12-06 PROCEDURE — 99213 OFFICE O/P EST LOW 20 MIN: CPT | Mod: S$GLB,,, | Performed by: FAMILY MEDICINE

## 2024-12-06 PROCEDURE — 1160F RVW MEDS BY RX/DR IN RCRD: CPT | Mod: CPTII,S$GLB,, | Performed by: FAMILY MEDICINE

## 2024-12-06 PROCEDURE — 3008F BODY MASS INDEX DOCD: CPT | Mod: CPTII,S$GLB,, | Performed by: FAMILY MEDICINE

## 2024-12-06 PROCEDURE — 3074F SYST BP LT 130 MM HG: CPT | Mod: CPTII,S$GLB,, | Performed by: FAMILY MEDICINE

## 2024-12-06 NOTE — PROGRESS NOTES
Asher Saldana  29 y.o.  MRN  [id number] PATIENT   1995   FINAL EDC:   ___   by ___    Baby: ___    SO Name: Avery ALLERGIES:    NKDA      GBS: ___  Date: ___ OB PROBLEM LIST:       TO-DO THROUGHOUT PREGNANCY:  Depression Screen: ___  Circumcision: ___  Rhogam: ___  Flu Shot: ___  TDAP: ___  Infant feeding: ___   Plans for epidural: ___  Classes at hospital: ___  PP contraception: ___  Delivery Consent: ___  TOLAC counseling: ___  BTL counseling: ___  Pediatrician: ___ MEDICATIONS:    Lamictal  Lexapro  PNV   TODAY'S PROGRESS NOTE    2024    OB INTAKE APPOINTMENT  Asher Saldana is a 29 y.o. who presents today for her OB Intake Appointment.    She denies any problems so far.      PREGNANCY DATING:    Pregnancy test today = positive  LMP 10/18/24 ---gives EDC---> 25 ----> 7+0 wks EGA today    Sure LMP: Yes  Prior US done: No  Other information relevant to dating (sure conception date, IVF date, etc.): No     CARRIER SCREENING PREVIOUSLY DONE:  Cystic Fibrosis, Spinal Muscular Atrophy, Fragile X:  Not previously tested  Hemoglobinopathies: Not previously tested     FAMILY LINEAGE AND HISTORY:  Ashkenazi or North American Religion:  No  Intellectual disability:  Yes - brother has Asperger's   Premature ovarian failure (<40yoa):  Yes - Mom went into menopause at 38  Cajun or Jamaican [place]:  No    MISCELLANEOUS:  Does the patient have cats? No    MEDICATIONS:  Current Outpatient Medications   Medication Instructions    EScitalopram oxalate (LEXAPRO) 20 mg, Oral, Daily    lamoTRIgine (LAMICTAL) 200 mg, Oral, Daily       --------------------------------------------------------------------------------     ASSESMENT & PLAN:  Pregnancy confirmed today with a positive pregnancy test.  Patient's medical history was obtained today.    A first trimester dating ultrasound was ordered and scheduled (ideally done between 8 and 10wks).  Gave patient our OB Welcome Packet and reviewed  "its contents.  Our discussion included:  an overview of appointments, hydration / nutrition / weight gain advice, safe OTC medications, what substances and exposures to avoid, vaccine recommendations, advice for morning sickness, dental hygiene advice, recommendations regarding exercise, advice for travel in pregnancy, information about breastfeeding, postpartum birth control, and circumcision, pediatricians in the community, WIC enrollment, how to contact us for concerns or problems during pregnancy, warning or danger signs.  Also provided [facility]'s publication, "Your Pregnancy from A-Z."    Advised patient to enroll in [employer] if not already done.    Medication management:.  Advised patient to start a prenatal vitamin if not already taking.  It should contain 600mcg folic acid, 27mg iron, and 200mg DHA.     Genetic and Carrier Screening options were discussed in detail with the patient.  Once her EDC is confirmed, she may go to [facility] for the test(s) if desired @ 9wks or greater.  She was encouraged to review the detailed information available in the OB Welcome Packet.    The patient was directed to the lab for  Routine OB labs    PAP smear: up to date    SAB precautions reviewed    Timing of next clinic appointment will be determined by dating ultrasound.  Will send patient a message in [employer].    Rosa Rojo, KAMRON     LABS   INITIAL LABS:    Use LNOB (for Epic auto-fill)  Use LLWOBLABS (for manual entry) OTHER LABS:    Use L28W (for Epic auto-fill)  Use LLWOBLABS (for manual entry)   ULTRASOUNDS   ANATOMY SCAN:    Use LLWOANATOMYSCAN      HISTORY   MEDICAL HISTORY:    Pre-pregnancy BMI = 24  Depression/anxiety  PTSD SURGICAL HISTORY:    none       OBSTETRIC HISTORY   Month/Year Mode of Delivery EGA Wt. M/F Epidural Complications / Comments   G1                                               SOCIAL HISTORY:    Smoker: non-smoker  Alcohol: yes but not since +HCG  Drugs: denies  Relationship: "   Domestic Violence: no  Lives with:   Education Level: Undergraduate Degree  Occupation:  [employer]  Sikh: [employer] [facility] HISTORY:    PAP'S: h/o abnormal & still being followed   LAST PAP: ASCUS / HPV POS (-16/-18) / Date: 6/26/2023  STD Hx: no past history  GENITAL HSV: no FAMILY HISTORY:    HTN: yes (dad)  DIABETES: no  BLEEDING D/O: no  CLOTTING D/O: no  BIRTH DEFECTS: no  MENTAL DISABILITY: no  GYN CANCER: no       Follow up for dating ultrasound around 9 wk EGA, 1st OB around 10-12 wk EGA.   Future Appointments   Date Time Provider Department Center   12/20/2024  2:00 PM ULTRASOUND, [facility] [place] [facility]   1/6/2025  3:00 PM Sabrina Clark MD [facility] [place] [facility]

## 2024-12-07 ENCOUNTER — LAB VISIT (OUTPATIENT)
Dept: LAB | Facility: HOSPITAL | Age: 29
End: 2024-12-07
Attending: FAMILY MEDICINE
Payer: COMMERCIAL

## 2024-12-07 DIAGNOSIS — Z32.00 ENCOUNTER FOR CONFIRMATION OF PREGNANCY TEST RESULT WITH PHYSICAL EXAMINATION: ICD-10-CM

## 2024-12-07 LAB
ABO + RH BLD: NORMAL
BASOPHILS # BLD AUTO: 0.02 K/UL (ref 0–0.2)
BASOPHILS NFR BLD: 0.3 % (ref 0–1.9)
BLD GP AB SCN CELLS X3 SERPL QL: NORMAL
DIFFERENTIAL METHOD BLD: NORMAL
EOSINOPHIL # BLD AUTO: 0.1 K/UL (ref 0–0.5)
EOSINOPHIL NFR BLD: 0.9 % (ref 0–8)
ERYTHROCYTE [DISTWIDTH] IN BLOOD BY AUTOMATED COUNT: 12.3 % (ref 11.5–14.5)
HBV SURFACE AG SERPL QL IA: NORMAL
HCT VFR BLD AUTO: 42.3 % (ref 37–48.5)
HCV AB SERPL QL IA: NORMAL
HGB BLD-MCNC: 14.3 G/DL (ref 12–16)
HIV 1+2 AB+HIV1 P24 AG SERPL QL IA: NORMAL
IMM GRANULOCYTES # BLD AUTO: 0.01 K/UL (ref 0–0.04)
IMM GRANULOCYTES NFR BLD AUTO: 0.2 % (ref 0–0.5)
LYMPHOCYTES # BLD AUTO: 1.4 K/UL (ref 1–4.8)
LYMPHOCYTES NFR BLD: 24 % (ref 18–48)
MCH RBC QN AUTO: 31 PG (ref 27–31)
MCHC RBC AUTO-ENTMCNC: 33.8 G/DL (ref 32–36)
MCV RBC AUTO: 92 FL (ref 82–98)
MONOCYTES # BLD AUTO: 0.4 K/UL (ref 0.3–1)
MONOCYTES NFR BLD: 7.7 % (ref 4–15)
NEUTROPHILS # BLD AUTO: 3.8 K/UL (ref 1.8–7.7)
NEUTROPHILS NFR BLD: 66.9 % (ref 38–73)
NRBC BLD-RTO: 0 /100 WBC
PLATELET # BLD AUTO: 250 K/UL (ref 150–450)
PMV BLD AUTO: 9.9 FL (ref 9.2–12.9)
RBC # BLD AUTO: 4.62 M/UL (ref 4–5.4)
TREPONEMA PALLIDUM IGG+IGM AB [PRESENCE] IN SERUM OR PLASMA BY IMMUNOASSAY: NONREACTIVE
WBC # BLD AUTO: 5.74 K/UL (ref 3.9–12.7)

## 2024-12-07 PROCEDURE — 87340 HEPATITIS B SURFACE AG IA: CPT | Performed by: FAMILY MEDICINE

## 2024-12-07 PROCEDURE — 36415 COLL VENOUS BLD VENIPUNCTURE: CPT | Mod: PO | Performed by: FAMILY MEDICINE

## 2024-12-07 PROCEDURE — 86901 BLOOD TYPING SEROLOGIC RH(D): CPT | Performed by: FAMILY MEDICINE

## 2024-12-07 PROCEDURE — 83021 HEMOGLOBIN CHROMOTOGRAPHY: CPT | Performed by: FAMILY MEDICINE

## 2024-12-07 PROCEDURE — 85025 COMPLETE CBC W/AUTO DIFF WBC: CPT | Performed by: FAMILY MEDICINE

## 2024-12-07 PROCEDURE — 86803 HEPATITIS C AB TEST: CPT | Performed by: FAMILY MEDICINE

## 2024-12-07 PROCEDURE — 87389 HIV-1 AG W/HIV-1&-2 AB AG IA: CPT | Performed by: FAMILY MEDICINE

## 2024-12-07 PROCEDURE — 86762 RUBELLA ANTIBODY: CPT | Performed by: FAMILY MEDICINE

## 2024-12-07 PROCEDURE — 86787 VARICELLA-ZOSTER ANTIBODY: CPT | Performed by: FAMILY MEDICINE

## 2024-12-07 PROCEDURE — 86593 SYPHILIS TEST NON-TREP QUANT: CPT | Performed by: FAMILY MEDICINE

## 2024-12-08 LAB — BACTERIA UR CULT: NO GROWTH

## 2024-12-09 ENCOUNTER — PATIENT MESSAGE (OUTPATIENT)
Dept: OBSTETRICS AND GYNECOLOGY | Facility: CLINIC | Age: 29
End: 2024-12-09
Payer: COMMERCIAL

## 2024-12-09 DIAGNOSIS — O21.9 NAUSEA/VOMITING IN PREGNANCY: Primary | ICD-10-CM

## 2024-12-09 LAB
RUBV IGG SER-ACNC: 43.9 IU/ML
RUBV IGG SER-IMP: REACTIVE
VARICELLA INTERPRETATION: POSITIVE
VARICELLA ZOSTER IGG: 2.15 S/CO

## 2024-12-09 RX ORDER — ONDANSETRON 4 MG/1
4 TABLET, ORALLY DISINTEGRATING ORAL EVERY 8 HOURS PRN
Qty: 20 TABLET | Refills: 1 | Status: SHIPPED | OUTPATIENT
Start: 2024-12-09

## 2024-12-11 LAB
HGB A2 MFR BLD HPLC: 2.5 % (ref 2.2–3.2)
HGB FRACT BLD ELPH-IMP: NORMAL
HGB FRACT BLD ELPH-IMP: NORMAL

## 2024-12-20 ENCOUNTER — HOSPITAL ENCOUNTER (OUTPATIENT)
Dept: OBSTETRICS AND GYNECOLOGY | Facility: CLINIC | Age: 29
Discharge: HOME | End: 2024-12-20
Payer: COMMERCIAL

## 2024-12-20 DIAGNOSIS — Z32.00 ENCOUNTER FOR CONFIRMATION OF PREGNANCY TEST RESULT WITH PHYSICAL EXAMINATION: ICD-10-CM

## 2024-12-20 PROCEDURE — 76801 OB US < 14 WKS SINGLE FETUS: CPT | Mod: 26,,, | Performed by: OBSTETRICS & GYNECOLOGY

## 2024-12-20 NOTE — PROGRESS NOTES
Based on your ultrasound, your final due date is 8/1/2025.    Your next office visit is on 1/6/25 with Dr. Clark as previously scheduled.     If you plan to have either or both of the optional genetic screening tests,we will order this as your next office visit.

## 2025-01-06 ENCOUNTER — INITIAL PRENATAL (OUTPATIENT)
Dept: OBSTETRICS AND GYNECOLOGY | Facility: CLINIC | Age: 30
End: 2025-01-06
Payer: COMMERCIAL

## 2025-01-06 VITALS
DIASTOLIC BLOOD PRESSURE: 66 MMHG | HEART RATE: 99 BPM | BODY MASS INDEX: 26.12 KG/M2 | WEIGHT: 138.25 LBS | SYSTOLIC BLOOD PRESSURE: 110 MMHG

## 2025-01-06 DIAGNOSIS — N87.1 CIN II (CERVICAL INTRAEPITHELIAL NEOPLASIA II): ICD-10-CM

## 2025-01-06 DIAGNOSIS — Z11.3 SCREENING EXAMINATION FOR VENEREAL DISEASE: ICD-10-CM

## 2025-01-06 DIAGNOSIS — Z12.4 SCREENING FOR MALIGNANT NEOPLASM OF THE CERVIX: ICD-10-CM

## 2025-01-06 DIAGNOSIS — Z34.81 ENCOUNTER FOR SUPERVISION OF OTHER NORMAL PREGNANCY IN FIRST TRIMESTER: Primary | ICD-10-CM

## 2025-01-06 PROBLEM — Z34.91 ENCOUNTER FOR SUPERVISION OF NORMAL PREGNANCY IN FIRST TRIMESTER: Status: ACTIVE | Noted: 2025-01-06

## 2025-01-06 PROCEDURE — 88175 CYTOPATH C/V AUTO FLUID REDO: CPT | Performed by: STUDENT IN AN ORGANIZED HEALTH CARE EDUCATION/TRAINING PROGRAM

## 2025-01-06 PROCEDURE — 0500F INITIAL PRENATAL CARE VISIT: CPT | Mod: CPTII,S$GLB,, | Performed by: STUDENT IN AN ORGANIZED HEALTH CARE EDUCATION/TRAINING PROGRAM

## 2025-01-06 PROCEDURE — 81515 NFCT DS BV&VAGINITIS DNA ALG: CPT | Performed by: STUDENT IN AN ORGANIZED HEALTH CARE EDUCATION/TRAINING PROGRAM

## 2025-01-06 PROCEDURE — 87491 CHLMYD TRACH DNA AMP PROBE: CPT | Performed by: STUDENT IN AN ORGANIZED HEALTH CARE EDUCATION/TRAINING PROGRAM

## 2025-01-06 PROCEDURE — 99999 PR PBB SHADOW E&M-EST. PATIENT-LVL III: CPT | Mod: PBBFAC,,, | Performed by: STUDENT IN AN ORGANIZED HEALTH CARE EDUCATION/TRAINING PROGRAM

## 2025-01-06 NOTE — PROGRESS NOTES
Asher Saldana  29 y.o.  MRN  [id number] PATIENT   1995   FINAL EDC:   8.1.25   by 8 wk US    Baby: ___    SO Name: Avery ALLERGIES:    NKDA      GBS: ___  Date: ___ OB PROBLEM LIST:    Depression/Anxiety  EFRA II   TO-DO THROUGHOUT PREGNANCY:  Depression Screen: ___  Circumcision: ___    Flu Shot: declined 1..  TDAP: ___  Infant feeding: ___   Plans for epidural: ___  Classes at hospital: ___  PP contraception: ___  Delivery Consent: ___    BTL counseling: ___  Pediatrician: ___ MEDICATIONS:    Lamictal  Lexapro  PNV   TODAY'S PROGRESS NOTE    Subjective:      Asher Saldana is being seen today for her first obstetrical visit.  She is at 10w3d gestation by 8 wk US.  She has no complaints today.  She denies any VB.    Her obstetrical history is significant for  EFRA II      Past Medical History:   Diagnosis Date    Abnormal Pap smear of cervix     Allergy     Anxiety     Chronic back pain     Headache(784.0)     Spina bifida         History reviewed. No pertinent surgical history.    Review of patient's allergies indicates:  No Known Allergies    Current Outpatient Medications   Medication Instructions    EScitalopram oxalate (LEXAPRO) 20 mg, Oral, Daily    lamoTRIgine (LAMICTAL) 200 mg, Oral, Daily    ondansetron (ZOFRAN-ODT) 4 mg, Oral, Every 8 hours PRN    promethazine (PHENERGAN) 25 mg, Oral, Every 6 hours PRN        OB History    Para Term  AB Living   2 0 0 0 0 0   SAB IAB Ectopic Multiple Live Births   0 0 0 0 0      # Outcome Date GA Lbr Efraín/2nd Weight Sex Type Anes PTL Lv   2 Current            1                 Past GYN history:  Denies any STI or abnl Pap smears    Family History   Problem Relation Name Age of Onset    Other Mother          hypolgycemia    Rheum arthritis Mother      Scoliosis Brother      Cancer Maternal Grandmother          astrocytoma    Heart disease Maternal Grandfather      Diabetes Maternal Grandfather      Rheum arthritis  Maternal Grandfather      Cancer Paternal Grandfather          glioblastoma      Denies any congenital/birth defects, DS, MR, SCD, CF, bleeding/clotting disorders    Social History     Tobacco Use    Smoking status: Every Day     Types: Vaping with nicotine     Passive exposure: Yes    Smokeless tobacco: Never    Tobacco comments:     mother smokes in house   Substance Use Topics    Alcohol use: Yes     Comment: socially    Drug use: No        Review of Systems       Review of Systems   Constitutional:  Negative for chills and fever.   Eyes:  Negative for visual disturbance.   Respiratory:  Negative for cough and shortness of breath.    Cardiovascular:  Negative for chest pain and palpitations.   Gastrointestinal:  Negative for abdominal pain, nausea and vomiting.   Genitourinary:  Negative for vaginal bleeding, vaginal discharge, vaginal pain and vaginal odor.   Neurological:  Negative for headaches.   Psychiatric/Behavioral:  Negative for depression and sleep disturbance. The patient is not nervous/anxious.    All other systems reviewed and are negative.  Breast: Negative for lump and mastodynia         Objective:     Vitals:    01/06/25 1520   BP: 110/66   Pulse: 99   Weight: 62.7 kg (138 lb 3.7 oz)           FHT: 167 by Vscan     General Appearance:    Alert, cooperative, no distress, appears stated age   Head:    Normocephalic, without obvious abnormality, atraumatic   Eyes:    conjunctiva/corneas clear       Nose:   Nares normal, septum midline, no drainage or sinus tenderness   Throat:   Lips normal; teeth and gums normal   Neck:   Supple, symmetrical, trachea midline, no adenopathy;     thyroid:  no enlargement/tenderness/nodules;   Back:     Symmetric, no curvature, ROM normal, no CVA tenderness   Lungs:     Clear to auscultation bilaterally, respirations unlabored   Chest Wall:    No tenderness or deformity    Heart:    Regular rate and rhythm, no murmur,   Breast Exam:    No tenderness, masses, or nipple  abnormality   Abdomen:     Soft, non-tender,  no masses, no organomegaly   Genitalia:    Normal female without lesion, discharge or tenderness       Extremities:   normal, atraumatic, no cyanosis or edema       Skin:   Skin color, texture, turgor normal, no rashes or lesions   Lymph nodes:   Cervical, supraclavicular, inguinal and axillary nodes normal        Assessment:      Pregnancy at 10 and 3/7 weeks      Problem List Items Addressed This Visit    None  Visit Diagnoses       Screening examination for venereal disease    -  Primary    Relevant Orders    C. trachomatis/N. gonorrhoeae by AMP DNA [facility]; Vagina    Vaginosis Screen by DNA Probe    Screening for malignant neoplasm of the cervix        Relevant Orders    Liquid-Based Pap Smear, Screening    EFRA II (cervical intraepithelial neoplasia II)        Relevant Orders    Liquid-Based Pap Smear, Screening    Encounter for supervision of other normal pregnancy in first trimester        Relevant Orders    [id number] Plus W/ Sca* T21, T18, T13 & Y + X             Plan:        Initial labs reviewed.  Continue Prenatal vitamins.  1T precautions given  Role of ultrasound in pregnancy discussed; fetal survey: requested.    Aspirin Questionnaire reviewed.  The patient is not a candidate for ASA 81mg daily for prevention of pre-eclampsia  Sleep Apnea screening completed, and the patient is considered low-risk.  Reviewed Carrier and Aneuploidy Screening with the patient.  She requests the Wusbutd09 cfDNA test only.  PAP smear: collected today; CT/NG testing: sent today (cervicovaginal)  Declined flu shot  RTC in 4 weeks    I spent a total of 40 minutes on the day of the visit.This includes face to face time and non-face to face time preparing to see the patient (eg, review of tests), obtaining and/or reviewing separately obtained history, documenting clinical information in the electronic or other health record, independently interpreting results and communicating  results to the patient/family/caregiver, or care coordinator.              LABS   INITIAL LABS:    DATE: 12/7/24  MBT: O positive  AB SCREEN: negative  TP-ABS: negative  RUBELLA: Immune  Hep B sAg: negative  Hep C Ab: negative  HIV: negative  H/H: 14.1 / 40.4  PLT: 245  VARICELLA: Immune  HGB: normal  URINE CX: negative    PAP: collected today / Date: 1.6.25    MARK/CHLAM/TRICH: collected today / Date: 1.6.25   OTHER LABS:    Use L28W (for Epic auto-fill)  Use LLWOBLABS (for manual entry)   ULTRASOUNDS   ANATOMY SCAN:    Use LLWOANATOMYSCAN      HISTORY   MEDICAL HISTORY:    Pre-pregnancy BMI = 24  Depression/anxiety  PTSD  EFRA II   SURGICAL HISTORY:    none       OBSTETRIC HISTORY   Month/Year Mode of Delivery EGA Wt. M/F Epidural Complications / Comments   G1                                               SOCIAL HISTORY:    Smoker: non-smoker  Alcohol: yes but not since +HCG  Drugs: denies  Relationship:   Domestic Violence: no  Lives with:   Education Level: Undergraduate Degree  Occupation:  [employer]  Amish: [employer] GYN HISTORY:    PAP'S: h/o abnormal & still being followed   LAST PAP: ASCUS / HPV POS (-16/-18) / Date: 6/26/2023  STD Hx: no past history  GENITAL HSV: no FAMILY HISTORY:    HTN: yes (dad)  DIABETES: no  BLEEDING D/O: no  CLOTTING D/O: no  BIRTH DEFECTS: no  MENTAL DISABILITY: no  GYN CANCER: no       No follow-ups on file.   No future appointments.

## 2025-01-07 LAB
BACTERIAL VAGINOSIS DNA: DETECTED
C TRACH DNA SPEC QL NAA+PROBE: NOT DETECTED
CANDIDA GLABRATA/KRUSEI: NOT DETECTED
CANDIDA RRNA VAG QL PROBE: NOT DETECTED
N GONORRHOEA DNA SPEC QL NAA+PROBE: NOT DETECTED
TRICHOMONAS VAGINALIS: NOT DETECTED

## 2025-01-08 ENCOUNTER — LAB VISIT (OUTPATIENT)
Dept: LAB | Facility: HOSPITAL | Age: 30
End: 2025-01-08
Attending: STUDENT IN AN ORGANIZED HEALTH CARE EDUCATION/TRAINING PROGRAM
Payer: COMMERCIAL

## 2025-01-08 DIAGNOSIS — Z34.81 ENCOUNTER FOR SUPERVISION OF OTHER NORMAL PREGNANCY IN FIRST TRIMESTER: ICD-10-CM

## 2025-01-08 PROCEDURE — 36415 COLL VENOUS BLD VENIPUNCTURE: CPT | Mod: PO | Performed by: STUDENT IN AN ORGANIZED HEALTH CARE EDUCATION/TRAINING PROGRAM

## 2025-01-10 LAB
FINAL PATHOLOGIC DIAGNOSIS: ABNORMAL
Lab: ABNORMAL

## 2025-01-15 DIAGNOSIS — O21.9 NAUSEA/VOMITING IN PREGNANCY: ICD-10-CM

## 2025-01-15 RX ORDER — PROMETHAZINE HYDROCHLORIDE 25 MG/1
25 TABLET ORAL EVERY 6 HOURS PRN
Qty: 20 TABLET | Refills: 1 | Status: SHIPPED | OUTPATIENT
Start: 2025-01-15

## 2025-01-15 NOTE — TELEPHONE ENCOUNTER
LOV: 1/6/25 Eduardo    NOV: 2/10/25     Preffered Pharmacy:  [facility] - [place], [place] - [address] [phone]

## 2025-01-17 LAB
ABOUT THE TEST: NORMAL
APPROVED BY: NORMAL
FETAL FRACTION: NORMAL
FETAL SEX RESULT: NORMAL
GESTATIONAL AGE > 9: YES
GESTATIONAL AGE: NORMAL
LAB DIRECTOR COMMENTS: NORMAL
LIMITATIONS:: NORMAL
MONOSOMY X RESULT: NOT DETECTED
NEGATIVE PREDICTIVE VALUE: NORMAL
NOTE: NORMAL
PERFORMANCE CHARACTERISTICS: NORMAL
PERFORMANCE: NORMAL
POSITIVE PREDICTIVE VALUE: NORMAL
RESULT: NEGATIVE
SERVICE CMNT 04-IMP: NORMAL
TEST METHODOLOGY:: NORMAL
TRISOMY 13 (T13): NEGATIVE
TRISOMY 18: NEGATIVE
TRISOMY 21 (T21): NEGATIVE
XXX (TRIPLE X SYNDROME): NOT DETECTED
XXY (KLINEFELTER SYNDROME): NOT DETECTED
XYY (JACOBS SYNDROME): NOT DETECTED

## 2025-02-10 ENCOUNTER — ROUTINE PRENATAL (OUTPATIENT)
Dept: OBSTETRICS AND GYNECOLOGY | Facility: CLINIC | Age: 30
End: 2025-02-10
Payer: COMMERCIAL

## 2025-02-10 VITALS
BODY MASS INDEX: 28.2 KG/M2 | SYSTOLIC BLOOD PRESSURE: 112 MMHG | HEART RATE: 94 BPM | WEIGHT: 149.25 LBS | DIASTOLIC BLOOD PRESSURE: 84 MMHG

## 2025-02-10 DIAGNOSIS — K21.9 GASTROESOPHAGEAL REFLUX DISEASE, UNSPECIFIED WHETHER ESOPHAGITIS PRESENT: ICD-10-CM

## 2025-02-10 DIAGNOSIS — Z36.3 ENCOUNTER FOR ANTENATAL SCREENING FOR MALFORMATION USING ULTRASOUND: ICD-10-CM

## 2025-02-10 DIAGNOSIS — Z34.82 ENCOUNTER FOR SUPERVISION OF OTHER NORMAL PREGNANCY IN SECOND TRIMESTER: Primary | ICD-10-CM

## 2025-02-10 DIAGNOSIS — O21.9 NAUSEA/VOMITING IN PREGNANCY: ICD-10-CM

## 2025-02-10 PROBLEM — Z34.92 ENCOUNTER FOR SUPERVISION OF NORMAL PREGNANCY IN SECOND TRIMESTER: Status: ACTIVE | Noted: 2025-01-06

## 2025-02-10 PROCEDURE — 0502F SUBSEQUENT PRENATAL CARE: CPT | Mod: CPTII,S$GLB,, | Performed by: STUDENT IN AN ORGANIZED HEALTH CARE EDUCATION/TRAINING PROGRAM

## 2025-02-10 PROCEDURE — 99999 PR PBB SHADOW E&M-EST. PATIENT-LVL III: CPT | Mod: PBBFAC,,, | Performed by: STUDENT IN AN ORGANIZED HEALTH CARE EDUCATION/TRAINING PROGRAM

## 2025-02-10 RX ORDER — PROMETHAZINE HYDROCHLORIDE 25 MG/1
25 TABLET ORAL EVERY 6 HOURS PRN
Qty: 20 TABLET | Refills: 1 | Status: SHIPPED | OUTPATIENT
Start: 2025-02-10

## 2025-02-10 RX ORDER — FAMOTIDINE 20 MG/1
20 TABLET, FILM COATED ORAL 2 TIMES DAILY
Qty: 60 TABLET | Refills: 2 | Status: SHIPPED | OUTPATIENT
Start: 2025-02-10 | End: 2026-02-10

## 2025-02-10 NOTE — PROGRESS NOTES
Asher Saldana  29 y.o.  MRN  [id number] PATIENT   1995   FINAL EDC:   8..25   by 8 wk US    Baby: ___    SO Name: Avery ALLERGIES:    NKDA      GBS: ___  Date: ___ OB PROBLEM LIST:    Depression/Anxiety    EFRA II (4:00 colpo bx; 7.10.23); Pap (25): ASCUS, +other HRHPV, colposcopy PP   TO-DO THROUGHOUT PREGNANCY:  Depression Screen: ___  Circumcision: ___    Flu Shot: declined 25  TDAP: ___  Infant feeding: ___   Plans for epidural: ___  Classes at hospital: ___  PP contraception: ___  Delivery Consent: ___    BTL counseling: ___  Pediatrician: ___ MEDICATIONS:    Lamictal  Lexapro  PNV   TODAY'S PROGRESS NOTE    Patient is doing well today. She reports no complaints. She reports she is still having nausea but it is controlled with daily Phenergan.  +GERD and is using pepcid.    She denies vaginal bleeding.  She denies leakage of fluid.  She denies contractions or abdominal pain.  She denies pelvic pressure.   She denies headaches, vision changes, right upper quadrant pain, non-dependent edema.    Vitals:    02/10/25 1528   BP: 112/84   Pulse: 94   Weight: 67.7 kg (149 lb 4 oz)       FHT: 153 by Vscan    Assessment:   1. IUP at 15w3d    1. Encounter for supervision of other normal pregnancy in second trimester    2. Encounter for  screening for malformation using ultrasound  -     US OB/GYN Procedure ([facility]) - Extended List; Future; Expected date: 2025    3. Gastroesophageal reflux disease, unspecified whether esophagitis present  -     famotidine (PEPCID) 20 MG tablet; Take 1 tablet (20 mg total) by mouth 2 (two) times daily. Can increase to 2 tablets twice a day if needed.  Dispense: 60 tablet; Refill: 2           Plan:    Return in 4 week  Pepcid Rx sent  Anatomy US scheduled for 5 weeks  2T precautions given                LABS   INITIAL LABS:    DATE: 24  MBT: O positive  AB SCREEN: negative  TP-ABS: negative  RUBELLA: Immune  Hep B sAg: negative  Hep  C Ab: negative  HIV: negative  H/H: 14.1 / 40.4  PLT: 245  VARICELLA: Immune  HGB: normal  URINE CX: negative    PAP: ASCUS+other HRHPV / Date: 1.6.25    MARK/CHLAM/TRICH: negative / Date: 1.6.25   OTHER LABS:    DATE: 1/6/25  cfDNA ([id number]): XX, neg for T13, T18, T21   CARRIER SCREEN (Inheritest Core):  declined         ULTRASOUNDS   ANATOMY SCAN:    Use LLWOANATOMYSCAN      HISTORY   MEDICAL HISTORY:    Pre-pregnancy BMI = 24  Depression/anxiety  PTSD  EFRA II   SURGICAL HISTORY:    none       OBSTETRIC HISTORY   Month/Year Mode of Delivery EGA Wt. M/F Epidural Complications / Comments   G1                                               SOCIAL HISTORY:    Smoker: non-smoker  Alcohol: yes but not since +HCG  Drugs: denies  Relationship:   Domestic Violence: no  Lives with:   Education Level: Undergraduate Degree  Occupation:  [employer]  Buddhism: [employer] GYN HISTORY:    PAP'S: h/o abnormal & still being followed   LAST PAP: ASCUS / HPV POS (-16/-18) / Date: 1/10/2025  STD Hx: no past history  GENITAL HSV: no FAMILY HISTORY:    HTN: yes (dad)  DIABETES: no  BLEEDING D/O: no  CLOTTING D/O: no  BIRTH DEFECTS: no  MENTAL DISABILITY: no  GYN CANCER: no       No follow-ups on file.   Future Appointments   Date Time Provider Department Center   3/13/2025  3:30 PM Sabrina Clark MD [facility] [place] [facility]   3/20/2025  3:00 PM ULTRASOUND, [facility]

## 2025-02-14 ENCOUNTER — PATIENT MESSAGE (OUTPATIENT)
Dept: OTHER | Facility: OTHER | Age: 30
End: 2025-02-14
Payer: COMMERCIAL

## 2025-02-21 ENCOUNTER — PATIENT MESSAGE (OUTPATIENT)
Dept: OTHER | Facility: OTHER | Age: 30
End: 2025-02-21
Payer: COMMERCIAL

## 2025-03-13 ENCOUNTER — ROUTINE PRENATAL (OUTPATIENT)
Dept: OBSTETRICS AND GYNECOLOGY | Facility: CLINIC | Age: 30
End: 2025-03-13
Payer: COMMERCIAL

## 2025-03-13 VITALS
SYSTOLIC BLOOD PRESSURE: 124 MMHG | HEART RATE: 99 BPM | BODY MASS INDEX: 30.16 KG/M2 | DIASTOLIC BLOOD PRESSURE: 70 MMHG | WEIGHT: 159.63 LBS

## 2025-03-13 DIAGNOSIS — Z34.82 ENCOUNTER FOR SUPERVISION OF OTHER NORMAL PREGNANCY IN SECOND TRIMESTER: Primary | ICD-10-CM

## 2025-03-13 PROCEDURE — 99999 PR PBB SHADOW E&M-EST. PATIENT-LVL III: CPT | Mod: PBBFAC,,, | Performed by: STUDENT IN AN ORGANIZED HEALTH CARE EDUCATION/TRAINING PROGRAM

## 2025-03-13 PROCEDURE — 0502F SUBSEQUENT PRENATAL CARE: CPT | Mod: CPTII,S$GLB,, | Performed by: STUDENT IN AN ORGANIZED HEALTH CARE EDUCATION/TRAINING PROGRAM

## 2025-03-13 NOTE — PROGRESS NOTES
Asher Saldana  29 y.o.  MRN  [id number] PATIENT   1995   FINAL EDC:   8..25   by 8 wk US    Baby: Aye LEVIN Name: Avery ALLERGIES:    NKDA      GBS: ___  Date: ___ OB PROBLEM LIST:    Depression/Anxiety    EFRA II (4:00 colpo bx; 7.10.23); Pap (.25): ASCUS, +other HRHPV, colposcopy PP   TO-DO THROUGHOUT PREGNANCY:  Depression Screen: 3.13.25  Circumcision: ___    Flu Shot: declined 25  TDAP: ___  Infant feeding: ___   Plans for epidural: ___  Classes at hospital: ___  PP contraception: ___  Delivery Consent: ___    BTL counseling: ___  Pediatrician: ___ MEDICATIONS:    Lamictal  Lexapro  PNV   TODAY'S PROGRESS NOTE    Patient is doing well today. She reports no complaints. GERD is well-controlled    She denies vaginal bleeding.  She denies leakage of fluid.  She denies contractions or abdominal pain.  She denies pelvic pressure.   She denies headaches, vision changes, right upper quadrant pain, non-dependent edema.    Vitals:    25 1523   BP: 124/70   Pulse: 99   Weight: 72.4 kg (159 lb 9.8 oz)       FHT: 146        3/13/2025     3:00 PM   [place]  Depression Scale   I have been able to laugh and see the funny side of things. 0   I have looked forward with enjoyment to things. 0   I have blamed myself unnecessarily when things went wrong. 1   I have been anxious or worried for no good reason. 2   I have felt scared or panicky for no good reason. 1   Things have been getting on top of me. 1   I have been so unhappy that I have had difficulty sleeping. 1   I have felt sad or miserable. 1   I have been so unhappy that I have been crying. 1   The thought of harming myself has occurred to me. 0       Assessment:   1. IUP at 19w6d    1. Encounter for supervision of other normal pregnancy in second trimester           Plan:    Return in 4 week  Keep anatomy appt scheduled for next week  2T precautions given  Readdress EDPS on RV                LABS   INITIAL  LABS:    DATE: 12/7/24  MBT: O positive  AB SCREEN: negative  TP-ABS: negative  RUBELLA: Immune  Hep B sAg: negative  Hep C Ab: negative  HIV: negative  H/H: 14.1 / 40.4  PLT: 245  VARICELLA: Immune  HGB: normal  URINE CX: negative    PAP: ASCUS+other HRHPV / Date: 1.6.25    MARK/CHLAM/TRICH: negative / Date: 1.6.25   OTHER LABS:    DATE: 1/6/25  cfDNA ([id number]): XX, neg for T13, T18, T21   CARRIER SCREEN (Inheritest Core):  declined         ULTRASOUNDS   ANATOMY SCAN:    Use LLWOANATOMYSCAN      HISTORY   MEDICAL HISTORY:    Pre-pregnancy BMI = 24  Depression/anxiety  PTSD  EFRA II   SURGICAL HISTORY:    none       OBSTETRIC HISTORY   Month/Year Mode of Delivery EGA Wt. M/F Epidural Complications / Comments   G1                                               SOCIAL HISTORY:    Smoker: non-smoker  Alcohol: yes but not since +HCG  Drugs: denies  Relationship:   Domestic Violence: no  Lives with:   Education Level: Undergraduate Degree  Occupation:  [employer]  Scientology: [employer] GYN HISTORY:    PAP'S: h/o abnormal & still being followed   LAST PAP: ASCUS / HPV POS (-16/-18) / Date: 1/10/2025  STD Hx: no past history  GENITAL HSV: no FAMILY HISTORY:    HTN: yes (dad)  DIABETES: no  BLEEDING D/O: no  CLOTTING D/O: no  BIRTH DEFECTS: no  MENTAL DISABILITY: no  GYN CANCER: no       No follow-ups on file.   Future Appointments   Date Time Provider Department Center   3/20/2025  3:00 PM [facility]   4/11/2025  2:30 PM [facility]   5/9/2025  2:30 PM [facility]   6/6/2025  4:00 PM Hillary Cloud [facility]   6/19/2025  4:00 PM Lester Coates [facility]   7/3/2025  4:00 PM Sabrina Clark MD [facility]

## 2025-03-14 ENCOUNTER — PATIENT MESSAGE (OUTPATIENT)
Dept: OTHER | Facility: OTHER | Age: 30
End: 2025-03-14
Payer: COMMERCIAL

## 2025-03-19 DIAGNOSIS — O21.9 NAUSEA/VOMITING IN PREGNANCY: ICD-10-CM

## 2025-03-19 RX ORDER — PROMETHAZINE HYDROCHLORIDE 25 MG/1
25 TABLET ORAL EVERY 6 HOURS PRN
Qty: 20 TABLET | Refills: 1 | Status: SHIPPED | OUTPATIENT
Start: 2025-03-19

## 2025-03-20 ENCOUNTER — HOSPITAL ENCOUNTER (OUTPATIENT)
Dept: OBSTETRICS AND GYNECOLOGY | Facility: CLINIC | Age: 30
Discharge: HOME | End: 2025-03-20
Attending: STUDENT IN AN ORGANIZED HEALTH CARE EDUCATION/TRAINING PROGRAM
Payer: COMMERCIAL

## 2025-03-20 DIAGNOSIS — Z36.3 ENCOUNTER FOR ANTENATAL SCREENING FOR MALFORMATION USING ULTRASOUND: ICD-10-CM

## 2025-03-20 PROCEDURE — 76805 OB US >/= 14 WKS SNGL FETUS: CPT | Mod: 26,,, | Performed by: OBSTETRICS & GYNECOLOGY

## 2025-04-11 ENCOUNTER — ROUTINE PRENATAL (OUTPATIENT)
Dept: OBSTETRICS AND GYNECOLOGY | Facility: CLINIC | Age: 30
End: 2025-04-11
Payer: COMMERCIAL

## 2025-04-11 ENCOUNTER — PATIENT MESSAGE (OUTPATIENT)
Dept: OTHER | Facility: OTHER | Age: 30
End: 2025-04-11
Payer: COMMERCIAL

## 2025-04-11 VITALS
BODY MASS INDEX: 31.87 KG/M2 | WEIGHT: 168.63 LBS | HEART RATE: 101 BPM | SYSTOLIC BLOOD PRESSURE: 110 MMHG | DIASTOLIC BLOOD PRESSURE: 76 MMHG

## 2025-04-11 DIAGNOSIS — Z36.2 ENCOUNTER FOR FOLLOW-UP ULTRASOUND OF FETAL ANATOMY: ICD-10-CM

## 2025-04-11 DIAGNOSIS — Z34.82 ENCOUNTER FOR SUPERVISION OF OTHER NORMAL PREGNANCY IN SECOND TRIMESTER: Primary | ICD-10-CM

## 2025-04-11 PROCEDURE — 99999 PR PBB SHADOW E&M-EST. PATIENT-LVL III: CPT | Mod: PBBFAC,,, | Performed by: STUDENT IN AN ORGANIZED HEALTH CARE EDUCATION/TRAINING PROGRAM

## 2025-04-11 NOTE — PROGRESS NOTES
Asher Saldana  29 y.o.  MRN  [id number] PATIENT   1995   FINAL EDC:   8   by 8 wk US    Baby: Aye LEVIN Name: Avery ALLERGIES:    NKDA      GBS: ___  Date: ___ OB PROBLEM LIST:    Depression/Anxiety    EFRA II (4:00 colpo bx; 7.10.); Pap (25): ASCUS, +other HRHPV, colposcopy PP   TO-DO THROUGHOUT PREGNANCY:  Depression Screen: 3.13.25      Flu Shot: declined 25  TDAP: ___  Infant feeding: ___   Plans for epidural: ___  Classes at hospital: ___  PP contraception: ___  Delivery Consent: 25    BTL counseling: ___  Pediatrician: ___ MEDICATIONS:    Lamictal  Lexapro  PNV   TODAY'S PROGRESS NOTE    Patient is doing well today. She reports no complaints. +FM    Mood have improved with N/V.      She denies vaginal bleeding.  She denies leakage of fluid.  She denies contractions or abdominal pain.  She denies pelvic pressure.   She denies headaches, vision changes, right upper quadrant pain, non-dependent edema.    Vitals:    25 0930   BP: 110/76   Pulse: 101   Weight: 76.5 kg (168 lb 10.4 oz)     FH: 24  FHT: 145      Assessment:   1. IUP at 24w0d    1. Encounter for supervision of other normal pregnancy in second trimester           Plan:    Return at 28 weeks  F/U anatomy as scheduled  2T precautions given  Discussed worsening mood symptoms.  Can adjust Lexapro and Lamictal PRN.  Today we obtained informed consent for delivery and blood transfusion.  We reviewed slides with visual representations that explained in detail what to expect in labor and delivery.  We addressed the fact that our role is to safely guide her and her baby through the birthing process although we are not in control of the process, and neither is she.  We will respond to what her baby and her body give us, as determined by FHR monitoring, tocometry, labor exams, and other clinical indicators.  She understands many emergencies can occur during labor and delivery, and our interventions aren't  always adequate or timely enough to prevent complications.  Two emergencies that we discussed in particular are shoulder dystocia and hemorrhage, and these were explained in detail.  She agrees to the use or administration of the following if indicated:  IV fluids, antibiotics, cervical ripening (mechanical and medicinal methods), oxytocin (Pitocin), amniotomy, IV or IM pain medications, epidural or spinal anesthesia, general anesthesia, internal monitors (FSE and IUPC), vacuum or forceps delivery, , episiotomy, medications and mechanical devices for treating hemorrhage, surgical interventions, blood transfusion, and other medicines and interventions considered important for her or her baby's health.  The patient understands that labor, delivery, and the above listed interventions can pose many risks to her and to her baby, which include but are not limited to, viral/bacterial infection, allergic reaction, temporary or permanent bodily injury or disability, brain damage, nerve damage, DVT/PE, and death.  Counseled on risk of blood transfusion including but not limited to fluid overload, fever, allergic reaction, and blood-borne infections.  She had adequate opportunity to ask questions, and they were answered to her apparent satisfaction.                  LABS   INITIAL LABS:    DATE: 24  MBT: O positive  AB SCREEN: negative  TP-ABS: negative  RUBELLA: Immune  Hep B sAg: negative  Hep C Ab: negative  HIV: negative  H/H: 14.1 / 40.4  PLT: 245  VARICELLA: Immune  HGB: normal  URINE CX: negative    PAP: ASCUS+other HRHPV / Date: 25    MARK/CHLAM/TRICH: negative / Date: 25   OTHER LABS:    DATE: 25  cfDNA ([id number]): XX, neg for T13, T18, T21   CARRIER SCREEN (Inheritest Core):  declined         ULTRASOUNDS   ANATOMY SCAN:    DATE: 3/20/25 @ 20wks:  SEX: female  EFW: 377 g  ANATOMY: wnl but incomplete  PLACENTA: posterior  CERVIX: normal length  OTHER: repeat in 6 weeks        HISTORY    MEDICAL HISTORY:    Pre-pregnancy BMI = 24  Depression/anxiety  PTSD  EFRA II   SURGICAL HISTORY:    none       OBSTETRIC HISTORY   Month/Year Mode of Delivery EGA Wt. M/F Epidural Complications / Comments   G1                                               SOCIAL HISTORY:    Smoker: non-smoker  Alcohol: yes but not since +HCG  Drugs: denies  Relationship:   Domestic Violence: no  Lives with:   Education Level: Undergraduate Degree  Occupation:  [employer]  Muslim: [employer] GYN HISTORY:    PAP'S: h/o abnormal & still being followed   LAST PAP: ASCUS / HPV POS (-16/-18) / Date: 1/10/2025  STD Hx: no past history  GENITAL HSV: no FAMILY HISTORY:    HTN: yes (dad)  DIABETES: no  BLEEDING D/O: no  CLOTTING D/O: no  BIRTH DEFECTS: no  MENTAL DISABILITY: no  GYN CANCER: no       No follow-ups on file.   Future Appointments   Date Time Provider Department Center   5/9/2025  2:30 PM [facility]   6/6/2025  4:00 PM Hillary Cloud [facility]   6/19/2025  4:00 PM [facility] Coates [facility]   7/3/2025  4:00 PM Sabrina Clark MD [facility]

## 2025-04-24 ENCOUNTER — OFFICE VISIT (OUTPATIENT)
Dept: PSYCHIATRY | Facility: CLINIC | Age: 30
End: 2025-04-24
Payer: COMMERCIAL

## 2025-04-24 VITALS
HEIGHT: 60 IN | WEIGHT: 168.44 LBS | HEART RATE: 100 BPM | DIASTOLIC BLOOD PRESSURE: 77 MMHG | BODY MASS INDEX: 33.07 KG/M2 | SYSTOLIC BLOOD PRESSURE: 120 MMHG

## 2025-04-24 DIAGNOSIS — F40.10 SOCIAL ANXIETY DISORDER: ICD-10-CM

## 2025-04-24 DIAGNOSIS — F41.1 GAD (GENERALIZED ANXIETY DISORDER): ICD-10-CM

## 2025-04-24 DIAGNOSIS — F31.70 BIPOLAR AFFECTIVE DISORDER IN REMISSION: ICD-10-CM

## 2025-04-24 PROCEDURE — 3078F DIAST BP <80 MM HG: CPT | Mod: CPTII,S$GLB,,

## 2025-04-24 PROCEDURE — 1159F MED LIST DOCD IN RCRD: CPT | Mod: CPTII,S$GLB,,

## 2025-04-24 PROCEDURE — 99215 OFFICE O/P EST HI 40 MIN: CPT | Mod: S$GLB,,,

## 2025-04-24 PROCEDURE — 1160F RVW MEDS BY RX/DR IN RCRD: CPT | Mod: CPTII,S$GLB,,

## 2025-04-24 PROCEDURE — 99999 PR PBB SHADOW E&M-EST. PATIENT-LVL III: CPT | Mod: PBBFAC,,,

## 2025-04-24 PROCEDURE — G2211 COMPLEX E/M VISIT ADD ON: HCPCS | Mod: S$GLB,,,

## 2025-04-24 PROCEDURE — 3074F SYST BP LT 130 MM HG: CPT | Mod: CPTII,S$GLB,,

## 2025-04-24 PROCEDURE — 3008F BODY MASS INDEX DOCD: CPT | Mod: CPTII,S$GLB,,

## 2025-04-24 RX ORDER — ESCITALOPRAM OXALATE 20 MG/1
20 TABLET ORAL DAILY
Qty: 90 TABLET | Refills: 0 | Status: SHIPPED | OUTPATIENT
Start: 2025-04-24

## 2025-04-24 RX ORDER — LAMOTRIGINE 200 MG/1
200 TABLET ORAL DAILY
Qty: 90 TABLET | Refills: 0 | Status: SHIPPED | OUTPATIENT
Start: 2025-04-24

## 2025-04-24 NOTE — PROGRESS NOTES
"OUTPATIENT PSYCHIATRY FOLLOW UP VISIT    Encounter Date: 4/24/2025    Clinical Status of Patient:  Outpatient (Ambulatory)    Chief Complaint:  Asher Saldana is a 29 y.o. female who presents today for follow-up.  Met with patient.      HISTORY OF PRESENTING ILLNESS:  Asher Saldana is a 29 y.o. female with history of bipolar I disorder, ESTER, SAD, insomnia, PMH asthma and chronic back pain,  who presents for follow up appointment.      Plan at last appointment:  Continue escitalopram 20 mg daily for mood and anxiety  Continue lamotrigine 200 mg daily for mood   Continue clonazepam 0.5 mg daily p.r.n. anxiety  Referral previously placed for individual psychotherapy, patient is on waitlist    Psychotropic medication history:   Sertraline (ineffective)  Hydroxyzine (sedation)    Care Team:  OB- Sabrina MD Eduardo    INTERVAL HISTORY:    Today Pt reports she is currently 25w 6d gestation. AGGIE is 8/1/25. She notes her mood has been stable and states, "Pregnancy has helped my mood. I'm doing great."  She continues escitalopram 20 mg daily and lamotrigine 200 mg daily. She discontinued clonazepam upon discovering she was pregnant.  Ran out of escitalopram 3 days ago.  Has not run out of lamotrigine.      Generalized anxiety is well controlled. 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 Less stressful. "It's a lot more fulfilling."    Is planning to start maternity leave in mid July.    Is patient experiencing or having changes in:  Trouble with sleep:  sleeping well  Appetite changes:  "too good"  Weight changes:  38 lb gain during pregnancy  Lack of energy:  "my energy is pretty good"  Anhedonia:  no  Somatic symptoms:  no  Irritability: no  Guilty/hopeless: no  Concentration: no  Racing thoughts: no  Impulsive behaviors: no  Paranoia/AVH: " no  Self-injurious behavior/risky behavior:  no  Any drugs:  no  Alcohol:  no    No interval episodes with symptoms consistent with connie or hypomania.  Denied interval or current suicidal/homicidal thoughts, intent, or plan or NSSI.  Denied other questions and concerns.    Medication side effects: None  Medication adherence: yes    MEDICAL REVIEW OF SYSTEMS:   Pain: Denies any significant chronic or acute pain.  Constitutional: Denies fever or change in appetite.  Cardiovascular: Denies chest pain or exertional dyspnea.  Respiratory: Denies cough or orthopnea.   GI: Denies abdominal pain, N/V  Neurological: Denies tremor, seizure, or focal weakness.  Psychiatric: See HPI above.    PAST PSYCHIATRIC, MEDICAL, AND SOCIAL HISTORY REVIEWED  The patient's past medical, family and social history have been reviewed and updated as appropriate within the electronic medical record - see encounter notes.    PAST MEDICAL HISTORY:   Past Medical History:   Diagnosis Date    Abnormal Pap smear of cervix     Allergy     Anxiety     Chronic back pain     Headache(784.0)     Spina bifida     Head trauma/Loss of consciousness: denies  Seizures: denies     PAST PSYCHIATRIC HISTORY:  Psychiatric Care (current & past): treatment via PCP in the past  Previous Psychiatric Diagnoses:  MDD, ESTER, SAD  Previous Psychiatric Hospitalizations: denies  Previous SI/HI:    during Covid, 1st few weeks on ICU; no attempts  Previous Suicide Attempts or NSSI: Cutting in adolescence   History of Psychotherapy: court ordered after parent's violent divorce  History of Violence: denies     FAMILY HISTORY:   Paternal: father bipolar and grandparents; etoh and opioid abuse;  suicide great grandfather   Maternal: mother bipolar disorder  and grandparents;  etoh and opioid abuse;    Siblings: brother hospitalized 2x for SI, ESTER, MDD,  by suicide 24     SOCIAL HISTORY:   Developmental/Childhood: younger childhood was good, at age 14 grandfather brain  cancer and whole family fell apart; father RN in [place] during [place] he became an etohic and opioid addict; then mother zheng  Marital Status/Relationship Status:  to Avery   Children: no, planning for next year  Resides/Housing Status: [place]   Occupation/Employment: RN - ICU at [employer]  Hobbies/Recreational Activities: reading, fantasy and sci fi books, going out with friends to festivals events, video games   Spirituality/Christian: no  Education level: Associates degree    History: denies  Legal History: denies  Access to firearms: yes, for self defence, kept separate from ammo      SUBSTANCE USE HISTORY:  Caffeine: yes, trying to decrease use, tries to limit to 1 cup coffee and 1 energy drink daily  Tobacco: vape with nicotine  3%   Alcohol: occasionally  Other Substances: denies  Addiction/Rehab: denies    EXAM:  Constitutional  Vitals:  Most recent vital signs were reviewed.   Last 3 sets of VS:      2/10/2025     3:28 PM 3/13/2025     3:23 PM 4/11/2025     9:30 AM   Vitals - 1 value per visit   SYSTOLIC 112 124 110   DIASTOLIC 84 70 76   Pulse 94 99 101   Weight (lb) 149.25 159.61 168.65   Weight (kg) 67.7 72.4 76.5      General:  unremarkable, age appropriate     Musculoskeletal  Muscle Strength/Tone:  no tremor or abnormal movements   Gait & Station:  Steady, non-ataxic     Psychiatric  Speech:  no latency; no press   Mood & Affect:  euthymic  congruent and appropriate   Thought Process:  normal and logical   Associations:  intact   Thought Content:  normal, no suicidality, no homicidality, delusions, or paranoia   Insight:  intact   Judgement: behavior is adequate to circumstances   Orientation:  grossly intact   Memory: intact for content of interview   Language: grossly intact   Attention Span & Concentration:  Intact to content of interview   Fund of Knowledge:  Not tested     SUICIDE RISK ASSESSMENT:  Protective factors: age, gender, no prior attempts, no prior hospitalizations, no  family h/o attempts, no ongoing substance abuse, no psychosis, engaged, denies SI/intent/plan, seeking treatment, access to treatment, future oriented, good primary support  Risks:  History of MDD, ESTER, SAD, history of suicidal ideation, remote history of nonsuicidal self-injury in the form of cutting, access to firearms, brother's suicide  Patient is a low immediate and long-term risk considering risk factors    RELEVANT LABS/STUDIES:    Lab Results   Component Value Date    WBC 9.00 12/15/2024    HGB 14.1 12/15/2024    HCT 40.4 12/15/2024    MCV 89 12/15/2024     12/15/2024     BMP  Lab Results   Component Value Date     (L) 12/15/2024    K 3.6 12/15/2024     12/15/2024    CO2 23 12/15/2024    BUN 9 12/15/2024    CREATININE 0.69 12/15/2024    CALCIUM 9.6 12/15/2024    ANIONGAP 9 12/15/2024    ESTGFRAFRICA >60.0 07/24/2020    EGFRNONAA >60.0 07/24/2020     Lab Results   Component Value Date    ALT 14 12/15/2024    AST 17 12/15/2024    ALKPHOS 68 12/15/2024    BILITOT 0.3 12/15/2024     Lab Results   Component Value Date    TSH 1.841 06/21/2023     Lab Results   Component Value Date    HGBA1C 4.7 06/21/2023     Lab Results   Component Value Date    CHOL 165 06/21/2023    TRIG 41 06/21/2023    HDL 66 06/21/2023    LDLCALC 90.8 06/21/2023    CHOLHDL 40.0 06/21/2023    TOTALCHOLEST 2.5 06/21/2023       IMPRESSION:    Asher Saldana is a 29 y.o. female with history of bipolar I disorder, ESTER, SAD, insomnia who presents for follow up appointment.    Status/Progress: Based on the examination today, the patient's problem(s) is/are well controlled.  New problems have not been presented today.   Co-morbidities are not complicating management of the primary condition.  There are no active rule-out diagnoses for this patient at this time.     Patient reports improvement in mood and anxiety after starting difficult titration of lamotrigine 1 month ago.  She is currently up to 100 mg daily.  Will  continue typical titration.  Discussed taper of escitalopram as this is most likely contributing to her symptoms of rapid cycling.  Patient verbalizes understanding.  We will begin taper next follow-up.    Patient reports worsening mood and marked anxiety with frequent panic attacks after her brother's death by suicide in January.  Discussed treatment options including augmenting with an agent such as aripiprazole.  Patient states she is leery of aripiprazole as this worsened her brother's depression.  She does not wish to start additional medication at this time would like to reassess at follow-up.  She does request p.r.n. medication for panic attacks    Risk Parameters:  Patient reports no suicidal ideation  Patient reports no homicidal ideation  Patient reports no self-injurious behavior  Patient reports no violent behavior    DIAGNOSES:  No diagnosis found.      PLAN:  Continue escitalopram 20 mg daily for mood and anxiety  Continue lamotrigine 200 mg daily for mood   Auto-discontinued clonazepam 0.5 mg daily p.r.n. anxiety upon discovery of pregnancy  Referral previously placed for individual psychotherapy, patient is on waitlist    RETURN TO CLINIC:   3 months      FELICITA Cronin, PMHNP-BC    42 minutes of total time spent on the encounter, which includes face to face time and non-face to face time preparing to see the patient (eg. review of tests), obtaining and/or reviewing separately obtained history, documenting clinical information in the electronic health record, independently interpreting results (not separately reported), and communicating results to the patient/family/caregiver, or care coordination (not separately reported).     Visit today included managing the longitudinal care of the patient due to the serious and/or complex managed problem(s) bipolar disorder, ESTER, social anxiety disorder, insomnia.    At this time there are no indications the patient represents an imminent danger to  "either themselves or others; will continue to manage treatment in the outpatient setting.    I discussed the patient's care with the patient including benefits, alternatives, possible adverse effects of the treatment plan; including the potential for metabolic complications, major organ dysfunction, black box warnings, and contraindications. The opportunity was given for questions/clarification, and after this discussion the above treatment plan was devised through shared decision making. The patient voiced their understanding of the diagnoses and treatments listed above and agreed to the treatment plan. Follow up plan was reviewed with the patient. The patient was advised to call to report any worsening of symptoms or problems with medication.    Supportive therapy and psychoeducation provided. Patient has been given crisis information including Suicide and Crisis Lifeline (call or text: 036). Patient also given instructions to go to the nearest ER or call 911 if unable to remain safe or if the Pt develops thoughts of harming self or others.    Documentation entered by me for this encounter may have been done in part using [employer] Direct voice recognition transcription software. Garbled syntax, mangled pronouns, and other bizarre constructions may be attributed to that software system. Although I have made an effort to ensure accuracy, "sound like" errors may exist and should be interpreted in context.    "

## 2025-04-25 ENCOUNTER — PATIENT MESSAGE (OUTPATIENT)
Dept: OTHER | Facility: OTHER | Age: 30
End: 2025-04-25
Payer: COMMERCIAL

## 2025-05-05 ENCOUNTER — LAB VISIT (OUTPATIENT)
Dept: LAB | Facility: HOSPITAL | Age: 30
End: 2025-05-05
Attending: STUDENT IN AN ORGANIZED HEALTH CARE EDUCATION/TRAINING PROGRAM
Payer: COMMERCIAL

## 2025-05-05 ENCOUNTER — HOSPITAL ENCOUNTER (OUTPATIENT)
Dept: OBSTETRICS AND GYNECOLOGY | Facility: CLINIC | Age: 30
Discharge: HOME | End: 2025-05-05
Attending: STUDENT IN AN ORGANIZED HEALTH CARE EDUCATION/TRAINING PROGRAM
Payer: COMMERCIAL

## 2025-05-05 DIAGNOSIS — Z36.2 ENCOUNTER FOR FOLLOW-UP ULTRASOUND OF FETAL ANATOMY: ICD-10-CM

## 2025-05-05 DIAGNOSIS — Z34.82 ENCOUNTER FOR SUPERVISION OF OTHER NORMAL PREGNANCY IN SECOND TRIMESTER: ICD-10-CM

## 2025-05-05 LAB
ABSOLUTE EOSINOPHIL (OHS): 0.08 K/UL
ABSOLUTE MONOCYTE (OHS): 0.84 K/UL (ref 0.3–1)
ABSOLUTE NEUTROPHIL COUNT (OHS): 7.05 K/UL (ref 1.8–7.7)
BASOPHILS # BLD AUTO: 0.03 K/UL
BASOPHILS NFR BLD AUTO: 0.3 %
ERYTHROCYTE [DISTWIDTH] IN BLOOD BY AUTOMATED COUNT: 13.1 % (ref 11.5–14.5)
FERRITIN SERPL-MCNC: 13 NG/ML (ref 20–300)
GLUCOSE SERPL-MCNC: 101 MG/DL (ref 70–140)
HCT VFR BLD AUTO: 37.7 % (ref 37–48.5)
HGB BLD-MCNC: 12 GM/DL (ref 12–16)
IMM GRANULOCYTES # BLD AUTO: 0.11 K/UL (ref 0–0.04)
IMM GRANULOCYTES NFR BLD AUTO: 1.1 % (ref 0–0.5)
LYMPHOCYTES # BLD AUTO: 1.58 K/UL (ref 1–4.8)
MCH RBC QN AUTO: 30.3 PG (ref 27–31)
MCHC RBC AUTO-ENTMCNC: 31.8 G/DL (ref 32–36)
MCV RBC AUTO: 95 FL (ref 82–98)
NUCLEATED RBC (/100WBC) (OHS): 0 /100 WBC
PLATELET # BLD AUTO: 217 K/UL (ref 150–450)
PMV BLD AUTO: 9.7 FL (ref 9.2–12.9)
RBC # BLD AUTO: 3.96 M/UL (ref 4–5.4)
RELATIVE EOSINOPHIL (OHS): 0.8 %
RELATIVE LYMPHOCYTE (OHS): 16.3 % (ref 18–48)
RELATIVE MONOCYTE (OHS): 8.7 % (ref 4–15)
RELATIVE NEUTROPHIL (OHS): 72.8 % (ref 38–73)
T PALLIDUM IGG+IGM SER QL: NORMAL
WBC # BLD AUTO: 9.69 K/UL (ref 3.9–12.7)

## 2025-05-05 PROCEDURE — 82950 GLUCOSE TEST: CPT

## 2025-05-05 PROCEDURE — 82728 ASSAY OF FERRITIN: CPT

## 2025-05-05 PROCEDURE — 86593 SYPHILIS TEST NON-TREP QUANT: CPT

## 2025-05-05 PROCEDURE — 85025 COMPLETE CBC W/AUTO DIFF WBC: CPT

## 2025-05-05 PROCEDURE — 76816 OB US FOLLOW-UP PER FETUS: CPT | Mod: 26,,, | Performed by: OBSTETRICS & GYNECOLOGY

## 2025-05-06 ENCOUNTER — RESULTS FOLLOW-UP (OUTPATIENT)
Dept: OBSTETRICS AND GYNECOLOGY | Facility: CLINIC | Age: 30
End: 2025-05-06

## 2025-05-09 ENCOUNTER — PATIENT MESSAGE (OUTPATIENT)
Dept: OTHER | Facility: OTHER | Age: 30
End: 2025-05-09
Payer: COMMERCIAL

## 2025-05-09 ENCOUNTER — ROUTINE PRENATAL (OUTPATIENT)
Dept: OBSTETRICS AND GYNECOLOGY | Facility: CLINIC | Age: 30
End: 2025-05-09
Payer: COMMERCIAL

## 2025-05-09 VITALS
DIASTOLIC BLOOD PRESSURE: 74 MMHG | BODY MASS INDEX: 34.1 KG/M2 | HEART RATE: 99 BPM | SYSTOLIC BLOOD PRESSURE: 120 MMHG | WEIGHT: 174.63 LBS

## 2025-05-09 DIAGNOSIS — Z36.2 ENCOUNTER FOR OTHER ANTENATAL SCREENING FOLLOW-UP: ICD-10-CM

## 2025-05-09 DIAGNOSIS — Z23 NEED FOR TDAP VACCINATION: ICD-10-CM

## 2025-05-09 DIAGNOSIS — Z34.83 ENCOUNTER FOR SUPERVISION OF OTHER NORMAL PREGNANCY IN THIRD TRIMESTER: Primary | ICD-10-CM

## 2025-05-09 PROBLEM — Z34.93 ENCOUNTER FOR SUPERVISION OF NORMAL PREGNANCY IN THIRD TRIMESTER: Status: ACTIVE | Noted: 2025-01-06

## 2025-05-09 PROCEDURE — 99999 PR PBB SHADOW E&M-EST. PATIENT-LVL III: CPT | Mod: PBBFAC,,, | Performed by: STUDENT IN AN ORGANIZED HEALTH CARE EDUCATION/TRAINING PROGRAM

## 2025-05-09 NOTE — PROGRESS NOTES
Asher Saldana  29 y.o.  MRN  [id number] PATIENT   1995   FINAL EDC:   8.1.25   by 8 wk US    Baby: Aye LEVIN Name: Avery ALLERGIES:    NKDA      GBS: ___  Date: ___ OB PROBLEM LIST:    Depression/Anxiety/Bipolar    EFRA II (4:00 colpo bx; 7.10.23); Pap (25): ASCUS, +other HRHPV, colposcopy PP   TO-DO THROUGHOUT PREGNANCY:  Depression Screen: 3      Flu Shot: declined 25  TDAP: 5.  Infant feeding: breast   Plans for epidural: ___  Classes at hospital: ___  PP contraception: declined  Delivery Consent: 25      Pediatrician: ___ MEDICATIONS:    Lamictal  Lexapro  PNV   TODAY'S PROGRESS NOTE    Patient is doing well today. She reports no complaints. +FM    She denies vaginal bleeding.  She denies leakage of fluid.  She denies contractions or abdominal pain.  She denies pelvic pressure.   She denies headaches, vision changes, right upper quadrant pain, non-dependent edema.    Vitals:    25 1427   BP: 120/74   Pulse: 99   Weight: 79.2 kg (174 lb 9.7 oz)     FH: 28  FHT: 132    Assessment:   1. IUP at 28w0d    1. Encounter for supervision of other normal pregnancy in third trimester    2. Need for Tdap vaccination  -     Tdap (BOOSTRIX) vaccine injection 0.5 mL    3. Encounter for other  screening follow-up  -     US OB/GYN Procedure ([facility]) - Extended List; Future; Expected date: 2025           Plan:    Keep follow as scheduled  Tdap given today  3T precautions given                LABS   INITIAL LABS:    DATE: 24  MBT: O positive  AB SCREEN: negative  TP-ABS: negative  RUBELLA: Immune  Hep B sAg: negative  Hep C Ab: negative  HIV: negative  H/H: 14.1 / 40.4  PLT: 245  VARICELLA: Immune  HGB: normal  URINE CX: negative    PAP: ASCUS+other HRHPV / Date: 25    MARK/CHLAM/TRICH: negative / Date: 25   OTHER LABS:    DATE: 25  cfDNA ([id number]): XX, neg for T13, T18, T21   CARRIER SCREEN (Inheritest Core): declined         ULTRASOUNDS   ANATOMY SCAN:    DATE: 3/20/25 @ 20wks:  SEX: female  EFW: 377 g  ANATOMY: wnl but incomplete  PLACENTA: posterior  CERVIX: normal length  OTHER: repeat in 6 weeks        HISTORY   MEDICAL HISTORY:    Pre-pregnancy BMI = 24  Depression/anxiety  PTSD  Bipolar  EFRA II   SURGICAL HISTORY:    none       OBSTETRIC HISTORY   Month/Year Mode of Delivery EGA Wt. M/F Epidural Complications / Comments   G1                                               SOCIAL HISTORY:    Smoker: non-smoker  Alcohol: yes but not since +HCG  Drugs: denies  Relationship:   Domestic Violence: no  Lives with:   Education Level: Undergraduate Degree  Occupation: [employer]  Latter-day: Yarsanism GYN HISTORY:    PAP'S: h/o abnormal & still being followed   LAST PAP: ASCUS / HPV POS (-16/-18) / Date: 1/10/2025  STD Hx: no past history  GENITAL HSV: no FAMILY HISTORY:    HTN: yes (dad)  DIABETES: no  BLEEDING D/O: no  CLOTTING D/O: no  BIRTH DEFECTS: no  MENTAL DISABILITY: no  GYN CANCER: no       No follow-ups on file.   Future Appointments   Date Time Provider Department Center   6/6/2025  4:00 PM Hillary Cloud MD [facility]   6/19/2025  4:00 PM Lester Coates MD [facility]   7/3/2025  4:00 PM Sabrina Clark MD [facility]   8/11/2025  8:00 AM Annalisa Rutherford NP [facility]

## 2025-05-21 NOTE — PROGRESS NOTES
Diagnosed in 07/2024.  Hx of radiation in 08/2024 and 01/2025.  Hx of hold on treatments due to multiple admission/toxicities/PCP pneumonia/PE.  Most recently receiving Taoxtere with plans to start chemotherapy after admission.    MRI brain showed stable enhancing lesions within the L superior occipital lobe and L parietal lobe with surrounding vasogenic edema, stable foci of restricted diffusion within the R posterior frontal lobe most likely sequela of subacute ischemia.  CT CAP showed multiple lesions with increase in size.    Continue increased dose of dexamethasone 3mg BID.  Continue Bactrim -160mg MWF.  Continue Keppra 1000mg BID.    Follows with Dr. Castro (Oncology), Dr Vuong (Radiation/Oncology), and Palliative as outpatient.  Follow-up with Neurology in 4-6 weeks as outpatient.   Subjective:       Patient ID: Asher Saldana is a 28 y.o. female.    Chief Complaint: Annual Exam (Tb gold test and vaccine record)      Asher Saldana is a 28 y.o. female who presents to clinic requesting updated immunization records and TB test for work. No concerns today.        Review of Systems   Respiratory:  Negative for shortness of breath.    Cardiovascular:  Negative for chest pain.   Skin:  Negative for rash.   Neurological:  Negative for dizziness.         Past Medical History:   Diagnosis Date    Abnormal Pap smear of cervix     Allergy     Anxiety     Chronic back pain     Headache(784.0)     Spina bifida        Review of patient's allergies indicates:  No Known Allergies      Current Outpatient Medications:     clonazePAM (KLONOPIN) 0.5 MG tablet, Take 1 tablet (0.5 mg total) by mouth once daily., Disp: 30 tablet, Rfl: 0    EScitalopram oxalate (LEXAPRO) 10 MG tablet, Take 1.5 tablets (15 mg total) by mouth once daily., Disp: 135 tablet, Rfl: 0    lamoTRIgine (LAMICTAL) 200 MG tablet, Take 1 tablet (200 mg total) by mouth once daily., Disp: 90 tablet, Rfl: 1    Objective:        Physical Exam  Vitals reviewed.   Constitutional:       Appearance: Normal appearance.   HENT:      Head: Normocephalic and atraumatic.   Eyes:      Conjunctiva/sclera: Conjunctivae normal.   Cardiovascular:      Rate and Rhythm: Normal rate and regular rhythm.      Heart sounds: Normal heart sounds.   Pulmonary:      Effort: Pulmonary effort is normal.      Breath sounds: Normal breath sounds.   Musculoskeletal:         General: Normal range of motion.      Cervical back: Normal range of motion and neck supple.   Skin:     General: Skin is warm and dry.   Neurological:      Mental Status: She is alert and oriented to person, place, and time.   Psychiatric:         Mood and Affect: Mood normal.         Behavior: Behavior normal.         Thought Content: Thought content normal.         Judgment: Judgment  normal.           Visit Vitals  /70 (BP Location: Right arm, Patient Position: Sitting, BP Method: Small (Manual))   Pulse 85   Resp 16   Ht 5' (1.524 m)   Wt 56.3 kg (124 lb 1.9 oz)   LMP 05/14/2024 (Within Days)   SpO2 98%   BMI 24.24 kg/m²      Assessment:         1. Physical exam        Plan:         Asher was seen today for annual exam.    Diagnoses and all orders for this visit:    Physical exam  -     QUANTIFERON GOLD TB; Future  -      Immunization records printed and given to patient       Follow up annually or sooner if needed.        Family Medicine Physician Assistant            All of your core healthy metrics are met.       I spent a total of 10 minutes on the day of the visit.This includes face to face time and non-face to face time preparing to see the patient (eg, review of tests), obtaining and/or reviewing separately obtained history, documenting clinical information in the electronic or other health record, independently interpreting results and communicating results to the patient/family/caregiver, or care coordinator.

## 2025-05-22 DIAGNOSIS — O21.9 NAUSEA/VOMITING IN PREGNANCY: ICD-10-CM

## 2025-05-22 RX ORDER — PROMETHAZINE HYDROCHLORIDE 25 MG/1
25 TABLET ORAL EVERY 6 HOURS PRN
Qty: 20 TABLET | Refills: 1 | Status: SHIPPED | OUTPATIENT
Start: 2025-05-22

## 2025-05-23 ENCOUNTER — PATIENT MESSAGE (OUTPATIENT)
Dept: OTHER | Facility: OTHER | Age: 30
End: 2025-05-23
Payer: COMMERCIAL

## 2025-06-02 ENCOUNTER — TELEPHONE (OUTPATIENT)
Dept: OTOLARYNGOLOGY | Facility: CLINIC | Age: 30
End: 2025-06-02
Payer: COMMERCIAL

## 2025-06-05 ENCOUNTER — ROUTINE PRENATAL (OUTPATIENT)
Dept: OBSTETRICS AND GYNECOLOGY | Facility: CLINIC | Age: 30
End: 2025-06-05
Payer: COMMERCIAL

## 2025-06-05 VITALS
WEIGHT: 180.75 LBS | SYSTOLIC BLOOD PRESSURE: 112 MMHG | BODY MASS INDEX: 35.31 KG/M2 | HEART RATE: 100 BPM | DIASTOLIC BLOOD PRESSURE: 70 MMHG

## 2025-06-05 DIAGNOSIS — O09.90 SUPERVISION OF HIGH RISK PREGNANCY, ANTEPARTUM: Primary | ICD-10-CM

## 2025-06-05 PROCEDURE — 0502F SUBSEQUENT PRENATAL CARE: CPT | Mod: CPTII,S$GLB,, | Performed by: GENERAL PRACTICE

## 2025-06-05 PROCEDURE — 99999 PR PBB SHADOW E&M-EST. PATIENT-LVL III: CPT | Mod: PBBFAC,,, | Performed by: GENERAL PRACTICE

## 2025-06-06 ENCOUNTER — PATIENT MESSAGE (OUTPATIENT)
Dept: OTHER | Facility: OTHER | Age: 30
End: 2025-06-06
Payer: COMMERCIAL

## 2025-06-23 ENCOUNTER — ROUTINE PRENATAL (OUTPATIENT)
Dept: OBSTETRICS AND GYNECOLOGY | Facility: CLINIC | Age: 30
End: 2025-06-23
Payer: COMMERCIAL

## 2025-06-23 VITALS
BODY MASS INDEX: 34.92 KG/M2 | WEIGHT: 178.81 LBS | HEART RATE: 120 BPM | DIASTOLIC BLOOD PRESSURE: 78 MMHG | SYSTOLIC BLOOD PRESSURE: 120 MMHG

## 2025-06-23 DIAGNOSIS — K21.9 GASTROESOPHAGEAL REFLUX DISEASE, UNSPECIFIED WHETHER ESOPHAGITIS PRESENT: ICD-10-CM

## 2025-06-23 DIAGNOSIS — O21.9 NAUSEA/VOMITING IN PREGNANCY: ICD-10-CM

## 2025-06-23 DIAGNOSIS — Z3A.34 34 WEEKS GESTATION OF PREGNANCY: Primary | ICD-10-CM

## 2025-06-23 PROCEDURE — 99999 PR PBB SHADOW E&M-EST. PATIENT-LVL III: CPT | Mod: PBBFAC,,, | Performed by: OBSTETRICS & GYNECOLOGY

## 2025-06-23 RX ORDER — FAMOTIDINE 20 MG/1
20 TABLET, FILM COATED ORAL 2 TIMES DAILY
Qty: 60 TABLET | Refills: 2 | Status: SHIPPED | OUTPATIENT
Start: 2025-06-23 | End: 2026-06-23

## 2025-06-23 RX ORDER — PROMETHAZINE HYDROCHLORIDE 25 MG/1
25 TABLET ORAL EVERY 6 HOURS PRN
Qty: 20 TABLET | Refills: 1 | Status: SHIPPED | OUTPATIENT
Start: 2025-06-23

## 2025-06-23 NOTE — PATIENT INSTRUCTIONS
To schedule classes (see below for what's offered) at the hospital, call [phone].    Have a question or concern?    PRO TIP: Program these phone numbers in your cell phone!    for an emergency  call 911 or go to the nearest hospital Especially after 20 weeks of the pregnancy, please remember that  Labor & Delivery is at [facility].  There is no L&D at [facility] ([facility] called [facility]).  After hours you can only access L&D through the Emergency Room (entrance on [facility]).   [facility] Nurse Care Advice Line  [phone] At any time during your pregnancy,  you can speak to a nurse 24-7.   for non-urgent issues, send us a  message in [place] Consider calling the Nurse Care Advice Line if it's a weekend or  toward the end of the work-day since  [place] and phone messages may not be answered for a day or two.   for non-urgent issues, call the clinic  [phone], Option 3    Labor & Delivery  [phone] Starting at 20 weeks of the pregnancy,  you can speak to a nurse on L&D 24-7.     THIRD TRIMESTER  29+0 - 42 weeks    Adapting to Pregnancy: Third Trimester   Some physical discomforts may seem worse in the final weeks of pregnancy. Simple lifestyle changes can help as you keep good posture and use good body mechanics.  Pay attention to your changing center of gravity.  Be kind to yourself and know your limits - your body is hosting an entire other human!  Ask for help if you need it.  Take fiber supplements, drink lots of water, and avoid prolonged sitting or standing to prevent constipation and hemorrhoids.  Be sure you're getting enough rest: avoid caffeine after 3pm, use a warm bath to relax before bedtime, ask for back/neck/shoulder massages from your partner, try drinking warm decaf tea or milk at bedtime, get heartburn under control.  Speaking of heartburnavoid spicy / acidic / sugary foods, especially in the evenings.  Eat slowly and in small amounts, and avoiding eating during the 2  hours before bedtime.  Try sleeping with your upper body elevated.    Your To-Do List  If you haven't already, get these important things done!  A few new tasks include having the carseat ready, having hospital bags packed, and making arrangements if needed for other children and/or pets while you're in the hospital.    We'll ask you to pre-register at the hospital around 36 weeks so you have a little less paperwork to do when the big day arrives.  You can walk-in at any time.  Go in the hospital's main entrance on [place] (near the pharmacy).  Walk in to Registration, which is across from the Information Desk.  You'll need your ID and insurance cards.    More information on these topics can be found in your OB Welcome Packet and the A-Z Book:  Choose a pediatrician for your baby.  Sign up for a tour and classes at the hospital.  All classes are free of charge if you are delivering at [facility].  Call [phone] to register for any of these classes:  Baby Love (learn about the delivery process and caring for a )  Big Brother / Big Sister Class (to help siblings prepare for baby)  Lamaze (a 4 week class to learn about natural interventions for labor)  Breastfeeding (get a head start learning about breastfeeding)  Work on some methods for coping with the pains of labor.  A good bit of labor happens before you can get an epidural, and you'll feel more confident if you have a plan that you've practiced.  We encourage everyone to breastfeed if they can (and most women can!).  Please ask us questions if you have them.  Decide what you'd like to use for contraception (birth control) after this baby is born.  If you're having a boy, let us know if you plan to have him circumcised.    Things to Look Out For  The Four Questions (please read more about these in your OB Welcome Packet): 1) Baby's Movements, 2) Contractions / Cramping, 3) Vaginal bleeding, 4) Leaking fluids  Mood swings and depression - some mood swings are  expected in pregnancy, but please ask for help if you are feeling overwhelmed or sad all the time.  Headaches that don't improve with rest, drinking water, and tylenol.  Severe swelling, especially of the face and hands. Remember some swelling of the lower legs is normal, especially if you have been on your feet for some time.    Intimacy   Unless your doctor tells you not to, it's still fine to continue having sex. The third trimester though can be a challenge comfort-wise. Try different positions and see what's best for you.    Baby!  Baby kicks and stretches despite running low on room, lanugo disappears, baby gains about a half of a pound per week, and bones harden (except for the skull, which remains soft and flexible for delivery).    OTHER INFORMATION:  If your provider orders labs or other studies, you probably won't hear from us unless something is abnormal.  How we define normal is different for many things in pregnancy.  This includes several of the numbers on a Complete Blood Count (CBC).  If your result is flagged as abnormal in MyChart but you don't hear from us, it's probably because things are actually normal based on where you are in your pregnancy.

## 2025-06-23 NOTE — PROGRESS NOTES
Prenatal Note   Chief Complaint:  Routine Prenatal Visit (34w3d)       Patient ID: Asher Saldana is a  29 y.o. female.    Date: 2025     Done: Declined or N/A: Date /  Notes:   Depression Screen (20wks) [x]  2025   Offered classes at hospital (20wks) [x]      Consent for delivery (24wks) [x]   2025   Circumcision Consent []  [x]  GIRL   TDAP (28wks) [x]  []  2025   Rhogam (28wks) []  [x]  O POS   Flu shot (OCT-MAY) []  [x]     RSV shot (32+0 - 36+6wks and SEP-) []  []     GBS collected (36wks) []      Pre-registered []      Birth Certificate Info Given []      Pediatrician selected []          Asher Saldana  29 y.o.  MRN  [id number] PATIENT   1995   FINAL EDC:   2025  by 8 wk US    Baby: Aye     Name: Avery ALLERGIES:    NKDA      GBS: ___  Date: ___ OB PROBLEM LIST:    Depression/Anxiety/Bipolar    EFRA II (4:00 colpo bx; 7.10.23); Pap (1..25): ASCUS, +other HRHPV, colposcopy PP   TO-DO THROUGHOUT PREGNANCY:  Depression Screen: 3.13.25      Flu Shot: declined 1..25  TDAP: 5.9.25  Infant feeding: breast   Plans for epidural: ___  Classes at hospital: ___  PP contraception: declined  Delivery Consent: 25      Pediatrician: Lyudmila Gu MEDICATIONS:    Lamictal  Lexapro  PNV  Phenergan prn  Famotidine prn   TODAY'S PROGRESS NOTE    Subjective   29 y.o. y.o.  at 34-3/7 weeks who presents for routine prenatal evaluation.      She reports normal FM..  No ik contractions but she reports issues with pelvic pressure.    She continues to have nausea and vomiting which has lasted throughout the pregnancy but recently reports an episode of diarrhea.  She is tolerating fluids and liquids.  She denies vaginal bleeding.  She denies leakage of fluid.  She denies headaches, vision changes or right upper quadrant pain.  No recent depression issues.    Objective  VITALS:  Initial Weight:  138 lb (+41 lb)  BP:   120/78    Vitals:    06/23/25 1540   BP: 120/78   Pulse: (!) 120     Wt Readings from Last 1 Encounters:   06/23/25 81.1 kg (178 lb 12.7 oz)       FH: 34 cm  FHT'S: 140's bpm by doppler    Office urinalysis on 6/23/2025    Negative:  Glucose / Bilirubin / Blood / Nitrites / Leukocyte esterase   POSITIVE:  KETONES trace / PROTEIN trace    Assessment & Plan  Intrauterine pregnancy at 34-3/7 weeks  Depression      The above was reviewed discussed with the patient.    We discussed the patient's longstanding issues with nausea.  Refill on medications provided.    We discussed her current tachycardia signs of dehydration and other potential issues.  Increase fluid hydration discussed.  Plans for rectovaginal group B strep culture at next evaluation discussed    3rd-trimester pregnancy issues and indications for evaluation on labor and delivery discussed.      The patient's questions regarding the above were answered and she is in agreement with the current plan.          LABS   INITIAL LABS:    DATE: 12/7/24  MBT: O positive  AB SCREEN: negative  TP-ABS: negative  RUBELLA: Immune  Hep B sAg: negative  Hep C Ab: negative  HIV: negative  H/H: 14.1 / 40.4  PLT: 245  VARICELLA: Immune  HGB: normal  URINE CX: negative    PAP: ASCUS+other HRHPV / Date: 1.6.25    MARK/CHLAM/TRICH: negative / Date: 1.6.25   OTHER LABS:    DATE: 1/6/25  cfDNA ([id number]): XX, neg for T13, T18, T21   [facility] declined     Lab Results   Component Value Date    WBC 9.69 05/05/2025    HGB 12.0 05/05/2025    HCT 37.7 05/05/2025     05/05/2025    MCV 95 05/05/2025    FERRITIN 13.0 (L) 05/05/2025     Lab Results   Component Value Date    OBGLUCOSESCR 101 05/05/2025     Lab Results   Component Value Date    TREPABIGMIGG Non-Reactive 05/05/2025      ULTRASOUNDS   ANATOMY SCAN:    DATE: 3/20/25 @ 20wks:  SEX: female  EFW: 377 g  ANATOMY: wnl but incomplete  PLACENTA: posterior  CERVIX: normal length  OTHER: repeat in 6  weeks    DATE: 5/5/2025 @ 27+3wks:  SIZE = DATES  ANATOMY: anatomy survey completed and wnl      HISTORY   MEDICAL HISTORY:    Pre-pregnancy BMI = 24  Depression/anxiety  PTSD  Bipolar  EFRA II   SURGICAL HISTORY:    none       OBSTETRIC HISTORY   Month/Year Mode of Delivery EGA Wt. M/F Epidural Complications / Comments   G1                                               SOCIAL HISTORY:    Smoker: non-smoker  Alcohol: yes but not since +HCG  Drugs: denies  Relationship:   Domestic Violence: no  Lives with:   Education Level: Undergraduate Degree  Occupation: [employer] nurse  Buddhist: Muslim GYN HISTORY:  DYSPLASIA HISTORY:  7/10/2023: EFRA 2 @ 4  1/10/2025: ASCUS / HPV POS (-16/-18)   --> colposcopy postpartum    STD Hx: no past history  GENITAL HSV: no FAMILY HISTORY:    HTN: yes (dad)  DIABETES: no  BLEEDING D/O: no  CLOTTING D/O: no  BIRTH DEFECTS: no  MENTAL DISABILITY: no  GYN CANCER: no         Plan:      34 weeks gestation of pregnancy    Gastroesophageal reflux disease, unspecified whether esophagitis present  -     famotidine (PEPCID) 20 MG tablet; Take 1 tablet (20 mg total) by mouth 2 (two) times daily. Can increase to 2 tablets twice a day if needed.  Dispense: 60 tablet; Refill: 2    Nausea/vomiting in pregnancy  -     promethazine (PHENERGAN) 25 MG tablet; Take 1 tablet (25 mg total) by mouth every 6 (six) hours as needed for Nausea.  Dispense: 20 tablet; Refill: 1  -     POCT Urinalysis(Instrument)       Follow up in about 2 weeks (around 7/7/2025) for Routine OB F/U or as needed.     Lester Coates MD  Department OBGYN  [facility]

## 2025-06-26 LAB
BILIRUBIN, UA POC OHS: NEGATIVE
BLOOD, UA POC OHS: NEGATIVE
CLARITY, UA POC OHS: CLEAR
COLOR, UA POC OHS: ABNORMAL
GLUCOSE, UA POC OHS: NEGATIVE
KETONES, UA POC OHS: ABNORMAL
LEUKOCYTES, UA POC OHS: NEGATIVE
NITRITE, UA POC OHS: NEGATIVE
PH, UA POC OHS: 7
PROTEIN, UA POC OHS: ABNORMAL
SPECIFIC GRAVITY, UA POC OHS: 1.02
UROBILINOGEN, UA POC OHS: 1

## 2025-06-27 ENCOUNTER — PATIENT MESSAGE (OUTPATIENT)
Dept: OTHER | Facility: OTHER | Age: 30
End: 2025-06-27
Payer: COMMERCIAL

## 2025-07-03 ENCOUNTER — ROUTINE PRENATAL (OUTPATIENT)
Dept: OBSTETRICS AND GYNECOLOGY | Facility: CLINIC | Age: 30
End: 2025-07-03
Payer: COMMERCIAL

## 2025-07-03 VITALS
HEART RATE: 112 BPM | DIASTOLIC BLOOD PRESSURE: 88 MMHG | SYSTOLIC BLOOD PRESSURE: 120 MMHG | WEIGHT: 182.56 LBS | BODY MASS INDEX: 35.65 KG/M2

## 2025-07-03 DIAGNOSIS — Z3A.35 35 WEEKS GESTATION OF PREGNANCY: Primary | ICD-10-CM

## 2025-07-03 PROCEDURE — 99213 OFFICE O/P EST LOW 20 MIN: CPT | Mod: PBBFAC,PO | Performed by: OBSTETRICS & GYNECOLOGY

## 2025-07-03 PROCEDURE — 99999 PR PBB SHADOW E&M-EST. PATIENT-LVL III: CPT | Mod: PBBFAC,,, | Performed by: OBSTETRICS & GYNECOLOGY

## 2025-07-03 NOTE — PATIENT INSTRUCTIONS
To schedule classes (see below for what's offered) at the hospital, call [phone].    Have a question or concern?    PRO TIP: Program these phone numbers in your cell phone!    for an emergency  call 911 or go to the nearest hospital Especially after 20 weeks of the pregnancy, please remember that  Labor & Delivery is at [facility].  There is no L&D at [facility] ([facility] called [facility]  After hours you can only access L&D through the Emergency Room (entrance on [facility]).   [facility] Nurse Care Advice Line  [phone] At any time during your pregnancy,  you can speak to a nurse 24-7.   for non-urgent issues, send us a  message in [employer] Consider calling the Nurse Care Advice Line if it's a weekend or  toward the end of the work-day since  [employer] and phone messages may not be answered for a day or two.   for non-urgent issues, call the clinic  [phone], Option 3    Labor & Delivery  [phone] Starting at 20 weeks of the pregnancy,  you can speak to a nurse on L&D 24-7.     THIRD TRIMESTER  29+0 - 42 weeks    Adapting to Pregnancy: Third Trimester   Some physical discomforts may seem worse in the final weeks of pregnancy. Simple lifestyle changes can help as you keep good posture and use good body mechanics.  Pay attention to your changing center of gravity.  Be kind to yourself and know your limits - your body is hosting an entire other human!  Ask for help if you need it.  Take fiber supplements, drink lots of water, and avoid prolonged sitting or standing to prevent constipation and hemorrhoids.  Be sure you're getting enough rest: avoid caffeine after 3pm, use a warm bath to relax before bedtime, ask for back/neck/shoulder massages from your partner, try drinking warm decaf tea or milk at bedtime, get heartburn under control.  Speaking of heartburnavoid spicy / acidic / sugary foods, especially in the evenings.  Eat slowly and in small amounts, and avoiding eating during the 2  hours before bedtime.  Try sleeping with your upper body elevated.    Your To-Do List  If you haven't already, get these important things done!  A few new tasks include having the carseat ready, having hospital bags packed, and making arrangements if needed for other children and/or pets while you're in the hospital.    We'll ask you to pre-register at the hospital around 36 weeks so you have a little less paperwork to do when the big day arrives.  You can walk-in at any time.  Go in the hospital's main entrance on [address] (near the pharmacy).  Walk in to Registration, which is across from the Information Desk.  You'll need your ID and insurance cards.    More information on these topics can be found in your OB Welcome Packet and the A-Z Book:  Choose a pediatrician for your baby.  Sign up for a tour and classes at the hospital.  All classes are free of charge if you are delivering at [facility].  Call [phone] to register for any of these classes:  Baby Love (learn about the delivery process and caring for a )  Big Brother / Big Sister Class (to help siblings prepare for baby)  Lamaze (a 4 week class to learn about natural interventions for labor)  Breastfeeding (get a head start learning about breastfeeding)  Work on some methods for coping with the pains of labor.  A good bit of labor happens before you can get an epidural, and you'll feel more confident if you have a plan that you've practiced.  We encourage everyone to breastfeed if they can (and most women can!).  Please ask us questions if you have them.  Decide what you'd like to use for contraception (birth control) after this baby is born.  If you're having a boy, let us know if you plan to have him circumcised.    Things to Look Out For  The Four Questions (please read more about these in your OB Welcome Packet): 1) Baby's Movements, 2) Contractions / Cramping, 3) Vaginal bleeding, 4) Leaking fluids  Mood swings and depression - some mood swings are  expected in pregnancy, but please ask for help if you are feeling overwhelmed or sad all the time.  Headaches that don't improve with rest, drinking water, and tylenol.  Severe swelling, especially of the face and hands. Remember some swelling of the lower legs is normal, especially if you have been on your feet for some time.    Intimacy   Unless your doctor tells you not to, it's still fine to continue having sex. The third trimester though can be a challenge comfort-wise. Try different positions and see what's best for you.    Baby!  Baby kicks and stretches despite running low on room, lanugo disappears, baby gains about a half of a pound per week, and bones harden (except for the skull, which remains soft and flexible for delivery).    OTHER INFORMATION:  If your provider orders labs or other studies, you probably won't hear from us unless something is abnormal.  How we define normal is different for many things in pregnancy.  This includes several of the numbers on a Complete Blood Count (CBC).  If your result is flagged as abnormal in MyChart but you don't hear from us, it's probably because things are actually normal based on where you are in your pregnancy.

## 2025-07-03 NOTE — PROGRESS NOTES
Prenatal Note   Chief Complaint:  Routine Prenatal Visit       Patient ID: Asher Saldana is a  29 y.o. female.    Date: 2025     Done: Declined or N/A: Date /  Notes:   Depression Screen (20wks) [x]  2025   Offered classes at hospital (20wks) [x]      Consent for delivery (24wks) [x]   2025   Circumcision Consent []  [x]  GIRL   TDAP (28wks) [x]  []  2025   Rhogam (28wks) []  [x]  O POS   Flu shot (OCT-MAY) []  [x]     RSV shot (32+0 - 36+6wks and SEP-) []  []     GBS collected (36wks) []      Pre-registered []      Birth Certificate Info Given []      Pediatrician selected []          Asher Saldana  29 y.o.  MRN  [id number] PATIENT   1995   FINAL EDC:   2025  by 8 wk US    Baby: Aye     Name: Avery ALLERGIES:    NKDA      GBS: ___  Date: ___ OB PROBLEM LIST:    Depression/Anxiety/Bipolar    EFRA II (4:00 colpo bx; 7.10.23); Pap (1..25): ASCUS, +other HRHPV, colposcopy PP   TO-DO THROUGHOUT PREGNANCY:  Depression Screen: 3.13.25      Flu Shot: declined 1..25  TDAP: 5.9.25  Infant feeding: breast   Plans for epidural: ___  Classes at hospital: ___  PP contraception: declined  Delivery Consent: 25      Pediatrician: Lyudmila Gu MEDICATIONS:    Lamictal  Lexapro  PNV  Phenergan prn  Famotidine prn   TODAY'S PROGRESS NOTE    Subjective   29 y.o. y.o.  at 35-6/7 weeks who presents for routine prenatal evaluation.      She reports normal FM..  She denies vaginal bleeding.  She denies leakage of fluid.  She denies headaches, vision changes or right upper quadrant pain.  No recent depression issues.  No significant contractions    Objective  VITALS:  Initial Weight:  138 lb (+45 lb)  BP:  120/88    Vitals:    25 1453   BP: 120/88   Pulse: (!) 112     Wt Readings from Last 1 Encounters:   25 82.8 kg (182 lb 8.7 oz)       FH: 36 cm  FHT'S: 130's bpm by doppler    A rectovaginal group B strep culture was  obtained today without difficulty.    Assessment & Plan  Intrauterine pregnancy at 34-3/7 weeks  Depression    The above was reviewed discussed with the patient and FOB.    From an OB standpoint she is currently doing well.  A rectovaginal group B strep culture was obtained today without difficulty.    Third-trimester pregnancy issues and indications for evaluation on labor and delivery discussed.      The patient's questions regarding the above were answered and she is in agreement with the current plan.        LABS   INITIAL LABS:    DATE: 12/7/24  MBT: O positive  AB SCREEN: negative  TP-ABS: negative  RUBELLA: Immune  Hep B sAg: negative  Hep C Ab: negative  HIV: negative  H/H: 14.1 / 40.4  PLT: 245  VARICELLA: Immune  HGB: normal  URINE CX: negative    PAP: ASCUS+other HRHPV / Date: 1.6.25    MARK/CHLAM/TRICH: negative / Date: 1.6.25   OTHER LABS:    DATE: 1/6/25  cfDNA ([id number]): XX, neg for T13, T18, T21   [facility] declined     Lab Results   Component Value Date    WBC 9.69 05/05/2025    HGB 12.0 05/05/2025    HCT 37.7 05/05/2025     05/05/2025    MCV 95 05/05/2025    FERRITIN 13.0 (L) 05/05/2025     Lab Results   Component Value Date    OBGLUCOSESCR 101 05/05/2025     Lab Results   Component Value Date    TREPABIGMIGG Non-Reactive 05/05/2025      ULTRASOUNDS   ANATOMY SCAN:    DATE: 3/20/25 @ 20wks:  SEX: female  EFW: 377 g  ANATOMY: wnl but incomplete  PLACENTA: posterior  CERVIX: normal length  OTHER: repeat in 6 weeks    DATE: 5/5/2025 @ 27+3wks:  SIZE = DATES  ANATOMY: anatomy survey completed and wnl      HISTORY   MEDICAL HISTORY:    Pre-pregnancy BMI = 24  Depression/anxiety  PTSD  Bipolar  EFRA II   SURGICAL HISTORY:    none       OBSTETRIC HISTORY   Month/Year Mode of Delivery EGA Wt. 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     Lester Coates MD  Department OBGYN  [facility]

## 2025-07-06 ENCOUNTER — RESULTS FOLLOW-UP (OUTPATIENT)
Dept: OBSTETRICS AND GYNECOLOGY | Facility: HOSPITAL | Age: 30
End: 2025-07-06

## 2025-07-10 ENCOUNTER — ROUTINE PRENATAL (OUTPATIENT)
Dept: OBSTETRICS AND GYNECOLOGY | Facility: CLINIC | Age: 30
End: 2025-07-10
Payer: COMMERCIAL

## 2025-07-10 VITALS
DIASTOLIC BLOOD PRESSURE: 70 MMHG | SYSTOLIC BLOOD PRESSURE: 126 MMHG | WEIGHT: 182.13 LBS | HEART RATE: 94 BPM | BODY MASS INDEX: 35.56 KG/M2

## 2025-07-10 DIAGNOSIS — Z3A.36 36 WEEKS GESTATION OF PREGNANCY: Primary | ICD-10-CM

## 2025-07-10 PROCEDURE — 99999 PR PBB SHADOW E&M-EST. PATIENT-LVL III: CPT | Mod: PBBFAC,,, | Performed by: OBSTETRICS & GYNECOLOGY

## 2025-07-10 NOTE — PATIENT INSTRUCTIONS
To schedule classes (see below for what's offered) at the hospital, call [phone].    Have a question or concern?    PRO TIP: Program these phone numbers in your cell phone!    for an emergency  call 911 or go to the nearest hospital Especially after 20 weeks of the pregnancy, please remember that  Labor & Delivery is at [facility].  There is no L&D at [facility] ([facility]  After hours you can only access L&D through the Emergency Room (entrance on [facility]).   [facility] Nurse Care Advice Line  [phone] At any time during your pregnancy,  you can speak to a nurse 24-7.   for non-urgent issues, send us a  message in [employer] Consider calling the Nurse Care Advice Line if it's a weekend or  toward the end of the work-day since  [employer] and phone messages may not be answered for a day or two.   for non-urgent issues, call the clinic  [phone], Option 3    Labor & Delivery  [phone] Starting at 20 weeks of the pregnancy,  you can speak to a nurse on L&D 24-7.     THIRD TRIMESTER  29+0 - 42 weeks    Adapting to Pregnancy: Third Trimester   Some physical discomforts may seem worse in the final weeks of pregnancy. Simple lifestyle changes can help as you keep good posture and use good body mechanics.  Pay attention to your changing center of gravity.  Be kind to yourself and know your limits - your body is hosting an entire other human!  Ask for help if you need it.  Take fiber supplements, drink lots of water, and avoid prolonged sitting or standing to prevent constipation and hemorrhoids.  Be sure you're getting enough rest: avoid caffeine after 3pm, use a warm bath to relax before bedtime, ask for back/neck/shoulder massages from your partner, try drinking warm decaf tea or milk at bedtime, get heartburn under control.  Speaking of heartburnavoid spicy / acidic / sugary foods, especially in the evenings.  Eat slowly and in small amounts, and avoiding eating during the 2  hours before bedtime.  Try sleeping with your upper body elevated.    Your To-Do List  If you haven't already, get these important things done!  A few new tasks include having the carseat ready, having hospital bags packed, and making arrangements if needed for other children and/or pets while you're in the hospital.    We'll ask you to pre-register at the hospital around 36 weeks so you have a little less paperwork to do when the big day arrives.  You can walk-in at any time.  Go in the hospital's main entrance on [address] (near the pharmacy).  Walk in to Registration, which is across from the Information Desk.  You'll need your ID and insurance cards.    More information on these topics can be found in your OB Welcome Packet and the A-Z Book:  Choose a pediatrician for your baby.  Sign up for a tour and classes at the hospital.  All classes are free of charge if you are delivering at [facility].  Call [phone] to register for any of these classes:  Baby Love (learn about the delivery process and caring for a )  Big Brother / Big Sister Class (to help siblings prepare for baby)  Lamaze (a 4 week class to learn about natural interventions for labor)  Breastfeeding (get a head start learning about breastfeeding)  Work on some methods for coping with the pains of labor.  A good bit of labor happens before you can get an epidural, and you'll feel more confident if you have a plan that you've practiced.  We encourage everyone to breastfeed if they can (and most women can!).  Please ask us questions if you have them.  Decide what you'd like to use for contraception (birth control) after this baby is born.  If you're having a boy, let us know if you plan to have him circumcised.    Things to Look Out For  The Four Questions (please read more about these in your OB Welcome Packet): 1) Baby's Movements, 2) Contractions / Cramping, 3) Vaginal bleeding, 4) Leaking fluids  Mood swings and depression - some mood swings are  expected in pregnancy, but please ask for help if you are feeling overwhelmed or sad all the time.  Headaches that don't improve with rest, drinking water, and tylenol.  Severe swelling, especially of the face and hands. Remember some swelling of the lower legs is normal, especially if you have been on your feet for some time.    Intimacy   Unless your doctor tells you not to, it's still fine to continue having sex. The third trimester though can be a challenge comfort-wise. Try different positions and see what's best for you.    Baby!  Baby kicks and stretches despite running low on room, lanugo disappears, baby gains about a half of a pound per week, and bones harden (except for the skull, which remains soft and flexible for delivery).    OTHER INFORMATION:  If your provider orders labs or other studies, you probably won't hear from us unless something is abnormal.  How we define normal is different for many things in pregnancy.  This includes several of the numbers on a Complete Blood Count (CBC).  If your result is flagged as abnormal in MyChart but you don't hear from us, it's probably because things are actually normal based on where you are in your pregnancy.

## 2025-07-10 NOTE — PROGRESS NOTES
Prenatal Note   Chief Complaint:  Routine Prenatal Visit       Patient ID: Asher Saldana is a  29 y.o. female.    Date: 07/10/2025    Asher Saldana  29 y.o.  MRN  [id number] PATIENT   1995   FINAL EDC:   2025  by 8 wk US    Baby: Aye     Name: Avery ALLERGIES:    NKDA      GBS:  Negative  Date:  2025 OB PROBLEM LIST:    Depression/Anxiety/Bipolar    EFRA II (4:00 colpo bx; 7.10.23); Pap (25): ASCUS, +other HRHPV, colposcopy PP   TO-DO THROUGHOUT PREGNANCY:  Depression Screen: 3..      Flu Shot: declined 25  TDAP: 5.  Infant feeding: breast   Plans for epidural: ___  Classes at hospital: ___  PP contraception: declined  Delivery Consent: 25      Pediatrician: Lyudmila Gu MEDICATIONS:    Lamictal  Lexapro  PNV  Phenergan prn  Famotidine prn   TODAY'S PROGRESS NOTE    Subjective   29 y.o. y.o.  at 36-6/7 weeks who presents for routine prenatal evaluation.      She reports normal FM.  She reports positive increasing uterine activity.  She denies vaginal bleeding.  She denies leakage of fluid.  She denies headaches, vision changes or right upper quadrant pain.  No recent depression issues.    Objective  VITALS:  Initial Weight:  138 lb (+44 lb)  BP:  126/70    Vitals:    07/10/25 1456   BP: 126/70   Pulse: 94     Wt Readings from Last 1 Encounters:   07/10/25 82.6 kg (182 lb 1.6 oz)       FH: 36 cm  FHT'S: 130's bpm by doppler    Assessment & Plan  Intrauterine pregnancy at 36-67 weeks  Depression    The above was reviewed discussed with the patient and FOB.    From an OB standpoint she is currently doing well.  Negative rectovaginal group B strep culture results discussed.    The patient's Pap smear history was reviewed and plans for colposcopy following delivery discussed (we discussed colpo evaluation 6-8 weeks after delivery to allow the cervix to reform)    Third-trimester pregnancy issues and indications for  evaluation on labor and delivery discussed.      The patient's questions regarding the above were answered and she is in agreement with the current plan.        LABS   INITIAL LABS:    DATE: 12/7/24  MBT: O positive  AB SCREEN: negative  TP-ABS: negative  RUBELLA: Immune  Hep B sAg: negative  Hep C Ab: negative  HIV: negative  H/H: 14.1 / 40.4  PLT: 245  VARICELLA: Immune  HGB: normal  URINE CX: negative    PAP: ASCUS+other HRHPV / Date: 1.6.25    MARK/CHLAM/TRICH: negative / Date: 1.6.25   OTHER LABS:    DATE: 1/6/25  cfDNA ([id number]): XX, neg for T13, T18, T21   CARRIER SCREEN (Inheritest Core): declined     Lab Results   Component Value Date    WBC 9.69 05/05/2025    HGB 12.0 05/05/2025    HCT 37.7 05/05/2025     05/05/2025    MCV 95 05/05/2025    FERRITIN 13.0 (L) 05/05/2025     Lab Results   Component Value Date    OBGLUCOSESCR 101 05/05/2025     Lab Results   Component Value Date    TREPABIGMIGG Non-Reactive 05/05/2025      ULTRASOUNDS   ANATOMY SCAN:    DATE: 3/20/25 @ 20wks:  SEX: female  EFW: 377 g  ANATOMY: wnl but incomplete  PLACENTA: posterior  CERVIX: normal length  OTHER: repeat in 6 weeks    DATE: 5/5/2025 @ 27+3wks:  SIZE = DATES  ANATOMY: anatomy survey completed and wnl      HISTORY   MEDICAL HISTORY:    Pre-pregnancy BMI = 24  Depression/anxiety  PTSD  Bipolar  EFRA II   SURGICAL HISTORY:    none       OBSTETRIC HISTORY   Month/Year Mode of Delivery EGA Wt. M/F Epidural Complications / Comments   G1                                               SOCIAL HISTORY:    Smoker: non-smoker  Alcohol: yes but not since +HCG  Drugs: denies  Relationship:   Domestic Violence: no  Lives with:   Education Level: Undergraduate Degree  Occupation: [employer] nurse  Lutheran: Rastafarian GYN HISTORY:  DYSPLASIA HISTORY:  7/10/2023: EFRA 2 @ 4  1/10/2025: ASCUS / HPV POS (-16/-18)   --> colposcopy postpartum    STD Hx: no past history  GENITAL HSV: no FAMILY HISTORY:    HTN: yes  (dad)  DIABETES: no  BLEEDING D/O: no  CLOTTING D/O: no  BIRTH DEFECTS: no  MENTAL DISABILITY: no  GYN CANCER: no         Plan:      36 weeks gestation of pregnancy      Follow up in about 1 week (around 7/17/2025) for Routine OB F/U or as needed.     Lester Coates MD  Department OBGYN  [facility]

## 2025-07-13 ENCOUNTER — HOSPITAL ENCOUNTER (OUTPATIENT)
Facility: HOSPITAL | Age: 30
Discharge: HOME | End: 2025-07-13
Attending: OBSTETRICS & GYNECOLOGY | Admitting: OBSTETRICS & GYNECOLOGY
Payer: COMMERCIAL

## 2025-07-13 VITALS
OXYGEN SATURATION: 97 % | DIASTOLIC BLOOD PRESSURE: 74 MMHG | HEART RATE: 93 BPM | TEMPERATURE: 98 F | SYSTOLIC BLOOD PRESSURE: 121 MMHG | RESPIRATION RATE: 18 BRPM

## 2025-07-13 DIAGNOSIS — Z34.90 PREGNANCY: ICD-10-CM

## 2025-07-13 PROCEDURE — 84112 EVAL AMNIOTIC FLUID PROTEIN: CPT

## 2025-07-13 PROCEDURE — 99211 OFF/OP EST MAY X REQ PHY/QHP: CPT

## 2025-07-13 NOTE — DISCHARGE INSTRUCTIONS
Keep your scheduled appointment with your provider.    Call your Doctor if you have any of the following:  Temperature above 100 degrees  Nausea, vomiting and/or diarrhea  Severe headache, dizziness, or blurred vision  Notable increase in swelling of hands or feet  Notable swelling of face and lips  Difficulty, pain or burning with urination  Foul smelling vaginal discharge  Decreased fetal movement    Come to the hospital if you have any of the following symptoms:  Your water breaks  More than 4-6 contractions in 1 hour for 2 or more hours  Vaginal bleeding like a period    After 28 weeks, you should feel 10 distinct fetal movements within a 2 hour period.    It is recommended that you drink 1/2 a gallon of water each day.  Tea, Soda and Juice are  in addition to this.    Labor and Delivery Phone number: [phone]    If you need to be seen on Labor and delivery between the hours of 6pm and 7am, please enter through the Emergency room entrance.

## 2025-07-13 NOTE — NURSING
Following report sent via secure chat to dr. Lovett. this is a patient of dr. Aminata chatamn 37 2/7 wks ega  with c/o leaking and vomited x1 on arrival to hospital. Negative nitrazine, and Neg ROM plus resulted. CAT 1 FHR strip with irregular mild contractions. No vaginal bleeding and good fetal movement. patient's next appt is on Thursday. 3/ 10 discomfort with contractions. SVE done and patient is closed/thick/high and medium posterior cervix. no fluid and no blood seen with sve. Received discharge order.

## 2025-07-13 NOTE — NURSING
[facility]  Department of Obstetrics and Gynecology  Labor & Delivery Triage Assessment    PATIENT NAME: Asher Saldana  MRN: [id number]  TODAY'S DATE: 2025    CHIEF COMPLAINT: No chief complaint on file.      OB History    Para Term  AB Living   1 0 0 0 0 0   SAB IAB Ectopic Multiple Live Births   0 0 0 0 0      # Outcome Date GA Lbr Efraní/2nd Weight Sex Type Anes PTL Lv   1 Current              Past Medical History:   Diagnosis Date    Abnormal Pap smear of cervix     Allergy     Anxiety     Chronic back pain     Headache(784.0)     Spina bifida      History reviewed. No pertinent surgical history.      VITAL SIGNS - ABNORMAL VITALS INCLUDE TEMP >100.4,RR <12 or >26, SUSTAINED MATERNAL PULSE <60 or >120     VITAL SIGNS (Most Recent)  Temp: 97.5 °F (36.4 °C) (25)  Pulse: 93 (25)  Resp: 18 (25)  BP: 121/74 (25 0425)  SpO2: 97 % (25)    VITAL SIGNS     normal  HEADACHE    no     VOMITING    yes  VISUAL DISTURBANCES  no  EPIGASTRIC PAIN        no  EDEMA FACE/EXTREMITIES            no    FETAL MOVEMENT     FETAL MOVEMENT: Active  FETAL HEART RATE BASELINE =  130    normal  FETAL HEART RATE VARIABILITY:  Moderate  FETAL HEART RATE ACCELERATIONS FOR GESTATIONAL AGE: present  FETAL HEART RATE DECELERATIONS: none    ABDOMINAL PAIN/CRAMPING/CONTRACTIONS     Patient is complaining of abdominal pain/cramping/contractions. Contraction pattern is Irregular, less than 5 minutes apart. Contraction strength is mild. Resting tone is relaxed. Abdominal palpation is non-tender.    RUPTURE OF MEMBRANES OR LEAKING OF AMNIOTIC FLUID     Patient reports leaking of amniotic fluid and is happened with urinating so unknown color in color and reporting amount as moderate. Nitrating is Negative. ROM plus collected is Negative. GBS status is Negative.    VAGINAL BLEEDING     Patient denies vaginal bleeding.    VAGINAL EXAM     DILATION:  0  STATION:   -3  EFFACEMENT:  0  PRESENTATION:  unknown      PAIN PRESENT ON ARRIVAL     ONSET:   0030  LOCATION:  Middle abdomin and back  PAIN SCALE (0-10):  3  DESCRIPTION: Tightening and cramps    Interventions     None.      PATIENT DISPOSITION     Discharged Home      Dr. nelson notified at 0428 of the above assessment.    Maddi Mathur, RN  [facility]  07/13/2025

## 2025-07-13 NOTE — NURSING
Patient arrived from home with c/o leaking off and on. Vomited x1 episode on arrival to hospital, reports having contractions q10 mins and denies vaginal bleeding.

## 2025-07-17 ENCOUNTER — ROUTINE PRENATAL (OUTPATIENT)
Dept: OBSTETRICS AND GYNECOLOGY | Facility: CLINIC | Age: 30
End: 2025-07-17
Payer: COMMERCIAL

## 2025-07-17 VITALS
SYSTOLIC BLOOD PRESSURE: 120 MMHG | HEART RATE: 97 BPM | WEIGHT: 184.94 LBS | DIASTOLIC BLOOD PRESSURE: 70 MMHG | BODY MASS INDEX: 36.12 KG/M2

## 2025-07-17 DIAGNOSIS — O09.90 SUPERVISION OF HIGH RISK PREGNANCY, ANTEPARTUM: Primary | ICD-10-CM

## 2025-07-17 PROCEDURE — 99999 PR PBB SHADOW E&M-EST. PATIENT-LVL III: CPT | Mod: PBBFAC,,, | Performed by: GENERAL PRACTICE

## 2025-07-17 PROCEDURE — 0502F SUBSEQUENT PRENATAL CARE: CPT | Mod: CPTII,S$GLB,, | Performed by: GENERAL PRACTICE

## 2025-07-17 NOTE — PATIENT INSTRUCTIONS
To schedule classes (see below for what's offered) at the hospital, call [phone].    Have a question or concern?    PRO TIP: Program these phone numbers in your cell phone!    for an emergency  call 911 or go to the nearest hospital Especially after 20 weeks of the pregnancy, please remember that  Labor & Delivery is at [facility].  There is no L&D at [facility] ([facility]  After hours you can only access L&D through the Emergency Room (entrance on [facility]).   [facility] Nurse Care Advice Line  [phone] At any time during your pregnancy,  you can speak to a nurse 24-7.   for non-urgent issues, send us a  message in [employer] Consider calling the Nurse Care Advice Line if it's a weekend or  toward the end of the work-day since  [employer] and phone messages may not be answered for a day or two.   for non-urgent issues, call the clinic  [phone], Option 3    Labor & Delivery  [phone] Starting at 20 weeks of the pregnancy,  you can speak to a nurse on L&D 24-7.     THIRD TRIMESTER  29+0 - 42 weeks    Adapting to Pregnancy: Third Trimester   Some physical discomforts may seem worse in the final weeks of pregnancy. Simple lifestyle changes can help as you keep good posture and use good body mechanics.  Pay attention to your changing center of gravity.  Be kind to yourself and know your limits - your body is hosting an entire other human!  Ask for help if you need it.  Take fiber supplements, drink lots of water, and avoid prolonged sitting or standing to prevent constipation and hemorrhoids.  Be sure you're getting enough rest: avoid caffeine after 3pm, use a warm bath to relax before bedtime, ask for back/neck/shoulder massages from your partner, try drinking warm decaf tea or milk at bedtime, get heartburn under control.  Speaking of heartburnavoid spicy / acidic / sugary foods, especially in the evenings.  Eat slowly and in small amounts, and avoiding eating during the 2  hours before bedtime.  Try sleeping with your upper body elevated.    Your To-Do List  If you haven't already, get these important things done!  A few new tasks include having the carseat ready, having hospital bags packed, and making arrangements if needed for other children and/or pets while you're in the hospital.    We'll ask you to pre-register at the hospital around 36 weeks so you have a little less paperwork to do when the big day arrives.  You can walk-in at any time.  Go in the hospital's main entrance on [address] (near the pharmacy).  Walk in to Registration, which is across from the Information Desk.  You'll need your ID and insurance cards.    More information on these topics can be found in your OB Welcome Packet and the A-Z Book:  Choose a pediatrician for your baby.  Sign up for a tour and classes at the hospital.  All classes are free of charge if you are delivering at [facility].  Call [phone] to register for any of these classes:  Baby Love (learn about the delivery process and caring for a )  Big Brother / Big Sister Class (to help siblings prepare for baby)  Lamaze (a 4 week class to learn about natural interventions for labor)  Breastfeeding (get a head start learning about breastfeeding)  Work on some methods for coping with the pains of labor.  A good bit of labor happens before you can get an epidural, and you'll feel more confident if you have a plan that you've practiced.  We encourage everyone to breastfeed if they can (and most women can!).  Please ask us questions if you have them.  Decide what you'd like to use for contraception (birth control) after this baby is born.  If you're having a boy, let us know if you plan to have him circumcised.    Things to Look Out For  The Four Questions (please read more about these in your OB Welcome Packet): 1) Baby's Movements, 2) Contractions / Cramping, 3) Vaginal bleeding, 4) Leaking fluids  Mood swings and depression - some mood swings are  expected in pregnancy, but please ask for help if you are feeling overwhelmed or sad all the time.  Headaches that don't improve with rest, drinking water, and tylenol.  Severe swelling, especially of the face and hands. Remember some swelling of the lower legs is normal, especially if you have been on your feet for some time.    Intimacy   Unless your doctor tells you not to, it's still fine to continue having sex. The third trimester though can be a challenge comfort-wise. Try different positions and see what's best for you.    Baby!  Baby kicks and stretches despite running low on room, lanugo disappears, baby gains about a half of a pound per week, and bones harden (except for the skull, which remains soft and flexible for delivery).    OTHER INFORMATION:  If your provider orders labs or other studies, you probably won't hear from us unless something is abnormal.  How we define normal is different for many things in pregnancy.  This includes several of the numbers on a Complete Blood Count (CBC).  If your result is flagged as abnormal in MyChart but you don't hear from us, it's probably because things are actually normal based on where you are in your pregnancy.

## 2025-07-17 NOTE — PROGRESS NOTES
Asher Saldana  29 y.o.  MRN  [id number] PATIENT   1995   FINAL EDC:   2025  by 8 wk US    Baby: Aye     Name: Avery ALLERGIES:    NKDA      GBS:  Negative  Date:  2025 OB PROBLEM LIST:    Depression/Anxiety/Bipolar    EFRA II (4:00 colpo bx; 7.10.23); Pap (1..25): ASCUS, +other HRHPV, colposcopy PP   TO-DO THROUGHOUT PREGNANCY:  Depression Screen: 3      Flu Shot: declined 25  TDAP: 5.  Infant feeding: breast   Plans for epidural: yes  Classes at hospital: ___  PP contraception: declined  Delivery Consent: 25      Pediatrician: Lyudmila Gu MEDICATIONS:  EScitalopram oxalate (LEXAPRO) 20 mg, Oral, Daily   famotidine (PEPCID) 20 mg, Oral, 2 times daily, Can increase to 2 tablets twice a day if needed.   lamoTRIgine (LAMICTAL) 200 mg, Oral, Daily   PRENAT VIT 17-IRON-FOLIC-OM3,6 ORAL 1 tablet, Daily   promethazine (PHENERGAN) 25 mg, Oral, Every 6 hours PRN      TODAY'S PROGRESS NOTE  2025    29 y.o.  at 37w6d  Appointment type: routine    SUBJECTIVE   No: Yes:  Notes:   Vaginal bleeding: [x] []     Concern for ROM / leaking fluids: [x]  []     Pain, cramps, contractions: []  [x]     Normal fetal movement: []  [x]  [] n/a   Other questions or concerns: [x]  []       OBJECTIVE  Vitals:    25 1555   BP: 120/70   Pulse: 97     FH: 39cm  DT'S: 150bpm by doppler  CEPHALIC by palpation.  EFW <= 7lbs by palpation    CVX deferred    ASSESSMENT AND PLAN   at 37w6d, doing well.    labor precautions reviewed, FMC reviewed  RTC weekly until delivery, sooner prn    Hillary Cloud MD        LABS   INITIAL LABS:    DATE: 24  MBT: O positive  AB SCREEN: negative  TP-ABS: negative  RUBELLA: Immune  Hep B sAg: negative  Hep C Ab: negative  HIV: negative  H/H: 14.1 / 40.4  PLT: 245  VARICELLA: Immune  HGB: normal  URINE CX: negative    PAP: ASCUS+other HRHPV / Date: 25    MARK/CHLAM/TRICH: negative / Date: 25   OTHER  LABS:    DATE: 1/6/25  cfDNA ([id number]): XX, neg for T13, T18, T21   CARRIER SCREEN (Inheritest Core): declined     Lab Results   Component Value Date    WBC 9.69 05/05/2025    HGB 12.0 05/05/2025    HCT 37.7 05/05/2025     05/05/2025    MCV 95 05/05/2025    FERRITIN 13.0 (L) 05/05/2025     Lab Results   Component Value Date    OBGLUCOSESCR 101 05/05/2025     Lab Results   Component Value Date    TREPABIGMIGG Non-Reactive 05/05/2025      ULTRASOUNDS   ANATOMY SCAN:    DATE: 3/20/25 @ 20wks:  SEX: female  EFW: 377 g  ANATOMY: wnl but incomplete  PLACENTA: posterior  CERVIX: normal length  OTHER: repeat in 6 weeks    DATE: 5/5/2025 @ 27+3wks:  SIZE = DATES  ANATOMY: anatomy survey completed and wnl      HISTORY   MEDICAL HISTORY:    Pre-pregnancy BMI = 24  Depression/anxiety  PTSD  Bipolar  EFRA II   SURGICAL HISTORY:    none       OBSTETRIC HISTORY   Month/Year Mode of Delivery EGA Wt. M/F Epidural Complications / Comments   G1                                               SOCIAL HISTORY:    Smoker: non-smoker  Alcohol: yes but not since +HCG  Drugs: denies  Relationship:   Domestic Violence: no  Lives with:   Education Level: Undergraduate Degree  Occupation: [employer] nurse  Gnosticist: Muslim GYN HISTORY:  DYSPLASIA HISTORY:  7/10/2023: EFRA 2 @ 4  1/10/2025: ASCUS / HPV POS (-16/-18)   --> colposcopy postpartum    STD Hx: no past history  GENITAL HSV: no FAMILY HISTORY:    HTN: yes (dad)  DIABETES: no  BLEEDING D/O: no  CLOTTING D/O: no  BIRTH DEFECTS: no  MENTAL DISABILITY: no  GYN CANCER: no

## 2025-07-24 ENCOUNTER — ROUTINE PRENATAL (OUTPATIENT)
Dept: OBSTETRICS AND GYNECOLOGY | Facility: CLINIC | Age: 30
End: 2025-07-24
Payer: COMMERCIAL

## 2025-07-24 ENCOUNTER — TELEPHONE (OUTPATIENT)
Dept: OBSTETRICS AND GYNECOLOGY | Facility: CLINIC | Age: 30
End: 2025-07-24

## 2025-07-24 VITALS
WEIGHT: 184.75 LBS | SYSTOLIC BLOOD PRESSURE: 130 MMHG | DIASTOLIC BLOOD PRESSURE: 80 MMHG | HEART RATE: 102 BPM | BODY MASS INDEX: 36.08 KG/M2

## 2025-07-24 DIAGNOSIS — Z3A.38 38 WEEKS GESTATION OF PREGNANCY: Primary | ICD-10-CM

## 2025-07-24 PROCEDURE — 99999 PR PBB SHADOW E&M-EST. PATIENT-LVL III: CPT | Mod: PBBFAC,,, | Performed by: OBSTETRICS & GYNECOLOGY

## 2025-07-24 NOTE — PROGRESS NOTES
Prenatal Note   Chief Complaint:  Routine Prenatal Visit (38w6)     Patient ID: Asher Saldana is a  29 y.o. female.    Date: 2025     Asher Saldana  29 y.o.  MRN  [id number] PATIENT   1995   FINAL EDC:   2025  by 8 wk US    Baby: yAe     Name: Avery ALLERGIES:    NKDA      GBS:  Negative  Date:  2025 OB PROBLEM LIST:    Depression/Anxiety/Bipolar    EFRA II (4:00 colpo bx; 7.10.23); Pap (25): ASCUS, +other HRHPV, colposcopy PP   TO-DO THROUGHOUT PREGNANCY:  Depression Screen: 3      Flu Shot: declined 25  TDAP: 5.  Infant feeding: breast   Plans for epidural: yes  Classes at hospital: ___  PP contraception: declined  Delivery Consent: 25      Pediatrician: Lyudmila Gu MEDICATIONS:  EScitalopram oxalate (LEXAPRO) 20 mg, Oral, Daily   famotidine (PEPCID) 20 mg, Oral, 2 times daily, Can increase to 2 tablets twice a day if needed.   lamoTRIgine (LAMICTAL) 200 mg, Oral, Daily   PRENAT VIT 17-IRON-FOLIC-OM3,6 ORAL 1 tablet, Daily   promethazine (PHENERGAN) 25 mg, Oral, Every 6 hours PRN      TODAY'S PROGRESS NOTE    Subjective   29 y.o. y.o.  at 38-6/7 weeks who presents for routine prenatal evaluation.      She reports normal FM..  She reports increasing uterine contractions.    She was seen on one occasion on labor and delivery to rule out labor  She denies vaginal bleeding.  She denies leakage of fluid.  She denies pelvic pressure.   She denies headaches, vision changes or right upper quadrant pain.    Objective  VITALS:  BP: 130/80    Vitals:    25 1554   BP: 130/80   Pulse: 102     Wt Readings from Last 1 Encounters:   25 83.8 kg (184 lb 11.9 oz)     FH: 37 cm  FHT'S: 150's bpm by doppler    Cervix is anterior, soft, closed, 50% cephalic 0 station    Assessment & Plan  Intrauterine pregnancy at 38-6/7 weeks  Depression/anxiety/PTSD    The above was reviewed discussed with the patient.    From an  OB standpoint she is uncomfortable but overall doing well.    Cervical exam discussed..    Third-trimester pregnancy issues and indications for evaluation on labor and delivery discussed.      The patient's questions regarding the above were answered and she is in agreement with the current plan.          LABS   INITIAL LABS:    DATE: 12/7/24  MBT: O positive  AB SCREEN: negative  TP-ABS: negative  RUBELLA: Immune  Hep B sAg: negative  Hep C Ab: negative  HIV: negative  H/H: 14.1 / 40.4  PLT: 245  VARICELLA: Immune  HGB: normal  URINE CX: negative    PAP: ASCUS+other HRHPV / Date: 1.6.25    MARK/CHLAM/TRICH: negative / Date: 1.6.25   OTHER LABS:    DATE: 1/6/25  cfDNA ([id number]): XX, neg for T13, T18, T21   CARRIER SCREEN (Inheritest Core): declined     Lab Results   Component Value Date    WBC 9.69 05/05/2025    HGB 12.0 05/05/2025    HCT 37.7 05/05/2025     05/05/2025    MCV 95 05/05/2025    FERRITIN 13.0 (L) 05/05/2025     Lab Results   Component Value Date    OBGLUCOSESCR 101 05/05/2025     Lab Results   Component Value Date    TREPABIGMIGG Non-Reactive 05/05/2025      ULTRASOUNDS   ANATOMY SCAN:    DATE: 3/20/25 @ 20wks:  SEX: female  EFW: 377 g  ANATOMY: wnl but incomplete  PLACENTA: posterior  CERVIX: normal length  OTHER: repeat in 6 weeks    DATE: 5/5/2025 @ 27+3wks:  SIZE = DATES  ANATOMY: anatomy survey completed and wnl      HISTORY   MEDICAL HISTORY:    Pre-pregnancy BMI = 24  Depression/anxiety  PTSD  Bipolar  EFRA II   SURGICAL HISTORY:    none       OBSTETRIC HISTORY   Month/Year Mode of Delivery EGA Wt. M/F Epidural Complications / Comments   G1                                               SOCIAL HISTORY:    Smoker: non-smoker  Alcohol: yes but not since +HCG  Drugs: denies  Relationship:   Domestic Violence: no  Lives with:   Education Level: Undergraduate Degree  Occupation: [employer] nurse  Lutheran: Jew GYN HISTORY:  DYSPLASIA HISTORY:  7/10/2023: EFRA 2 @  4  1/10/2025: ASCUS / HPV POS (-16/-18)   --> colposcopy postpartum    STD Hx: no past history  GENITAL HSV: no FAMILY HISTORY:    HTN: yes (dad)  DIABETES: no  BLEEDING D/O: no  CLOTTING D/O: no  BIRTH DEFECTS: no  MENTAL DISABILITY: no  GYN CANCER: no         Plan:      38 weeks gestation of pregnancy       Follow up in about 1 week (around 7/31/2025) for Routine OB F/U or as needed.     Lester Coates MD  Department OBGYN  [facility]

## 2025-07-24 NOTE — PATIENT INSTRUCTIONS
To schedule classes (see below for what's offered) at the hospital, call [phone].    Have a question or concern?    PRO TIP: Program these phone numbers in your cell phone!    for an emergency  call 911 or go to the nearest hospital Especially after 20 weeks of the pregnancy, please remember that  Labor & Delivery is at [facility].  There is no L&D at [facility] ([facility]  After hours you can only access L&D through the Emergency Room (entrance on [facility]).   [facility] Nurse Care Advice Line  [phone] At any time during your pregnancy,  you can speak to a nurse 24-7.   for non-urgent issues, send us a  message in [employer] Consider calling the Nurse Care Advice Line if it's a weekend or  toward the end of the work-day since  [employer] and phone messages may not be answered for a day or two.   for non-urgent issues, call the clinic  [phone], Option 3    Labor & Delivery  [phone] Starting at 20 weeks of the pregnancy,  you can speak to a nurse on L&D 24-7.     THIRD TRIMESTER  29+0 - 42 weeks    Adapting to Pregnancy: Third Trimester   Some physical discomforts may seem worse in the final weeks of pregnancy. Simple lifestyle changes can help as you keep good posture and use good body mechanics.  Pay attention to your changing center of gravity.  Be kind to yourself and know your limits - your body is hosting an entire other human!  Ask for help if you need it.  Take fiber supplements, drink lots of water, and avoid prolonged sitting or standing to prevent constipation and hemorrhoids.  Be sure you're getting enough rest: avoid caffeine after 3pm, use a warm bath to relax before bedtime, ask for back/neck/shoulder massages from your partner, try drinking warm decaf tea or milk at bedtime, get heartburn under control.  Speaking of heartburnavoid spicy / acidic / sugary foods, especially in the evenings.  Eat slowly and in small amounts, and avoiding eating during the 2  hours before bedtime.  Try sleeping with your upper body elevated.    Your To-Do List  If you haven't already, get these important things done!  A few new tasks include having the carseat ready, having hospital bags packed, and making arrangements if needed for other children and/or pets while you're in the hospital.    We'll ask you to pre-register at the hospital around 36 weeks so you have a little less paperwork to do when the big day arrives.  You can walk-in at any time.  Go in the hospital's main entrance on [address] (near the pharmacy).  Walk in to Registration, which is across from the Information Desk.  You'll need your ID and insurance cards.    More information on these topics can be found in your OB Welcome Packet and the A-Z Book:  Choose a pediatrician for your baby.  Sign up for a tour and classes at the hospital.  All classes are free of charge if you are delivering at [facility].  Call [phone] to register for any of these classes:  Baby Love (learn about the delivery process and caring for a )  Big Brother / Big Sister Class (to help siblings prepare for baby)  Lamaze (a 4 week class to learn about natural interventions for labor)  Breastfeeding (get a head start learning about breastfeeding)  Work on some methods for coping with the pains of labor.  A good bit of labor happens before you can get an epidural, and you'll feel more confident if you have a plan that you've practiced.  We encourage everyone to breastfeed if they can (and most women can!).  Please ask us questions if you have them.  Decide what you'd like to use for contraception (birth control) after this baby is born.  If you're having a boy, let us know if you plan to have him circumcised.    Things to Look Out For  The Four Questions (please read more about these in your OB Welcome Packet): 1) Baby's Movements, 2) Contractions / Cramping, 3) Vaginal bleeding, 4) Leaking fluids  Mood swings and depression - some mood swings are  expected in pregnancy, but please ask for help if you are feeling overwhelmed or sad all the time.  Headaches that don't improve with rest, drinking water, and tylenol.  Severe swelling, especially of the face and hands. Remember some swelling of the lower legs is normal, especially if you have been on your feet for some time.    Intimacy   Unless your doctor tells you not to, it's still fine to continue having sex. The third trimester though can be a challenge comfort-wise. Try different positions and see what's best for you.    Baby!  Baby kicks and stretches despite running low on room, lanugo disappears, baby gains about a half of a pound per week, and bones harden (except for the skull, which remains soft and flexible for delivery).    OTHER INFORMATION:  If your provider orders labs or other studies, you probably won't hear from us unless something is abnormal.  How we define normal is different for many things in pregnancy.  This includes several of the numbers on a Complete Blood Count (CBC).  If your result is flagged as abnormal in MyChart but you don't hear from us, it's probably because things are actually normal based on where you are in your pregnancy.

## 2025-07-25 ENCOUNTER — PATIENT MESSAGE (OUTPATIENT)
Dept: OBSTETRICS AND GYNECOLOGY | Facility: CLINIC | Age: 30
End: 2025-07-25
Payer: COMMERCIAL

## 2025-07-25 DIAGNOSIS — Z3A.39 39 WEEKS GESTATION OF PREGNANCY: Primary | ICD-10-CM

## 2025-07-25 RX ORDER — ONDANSETRON 8 MG/1
8 TABLET, ORALLY DISINTEGRATING ORAL EVERY 8 HOURS PRN
OUTPATIENT
Start: 2025-07-25

## 2025-07-25 RX ORDER — MISOPROSTOL 100 MCG
25 TABLET ORAL EVERY 4 HOURS PRN
OUTPATIENT
Start: 2025-07-25

## 2025-07-25 RX ORDER — OXYTOCIN-SODIUM CHLORIDE 0.9% IV SOLN 30 UNIT/500ML 30-0.9/5 UT/ML-%
30 SOLUTION INTRAVENOUS ONCE AS NEEDED
OUTPATIENT
Start: 2025-07-25 | End: 2036-12-21

## 2025-07-25 RX ORDER — OXYTOCIN 10 [USP'U]/ML
10 INJECTION, SOLUTION INTRAMUSCULAR; INTRAVENOUS ONCE AS NEEDED
OUTPATIENT
Start: 2025-07-25 | End: 2036-12-21

## 2025-07-25 RX ORDER — LIDOCAINE HYDROCHLORIDE 10 MG/ML
10 INJECTION, SOLUTION INFILTRATION; PERINEURAL ONCE AS NEEDED
OUTPATIENT
Start: 2025-07-25 | End: 2036-12-21

## 2025-07-25 RX ORDER — METHYLERGONOVINE MALEATE 0.2 MG/ML
200 INJECTION INTRAVENOUS ONCE AS NEEDED
OUTPATIENT
Start: 2025-07-25 | End: 2036-12-21

## 2025-07-25 RX ORDER — CALCIUM CARBONATE 200(500)MG
500 TABLET,CHEWABLE ORAL 3 TIMES DAILY PRN
OUTPATIENT
Start: 2025-07-25

## 2025-07-25 RX ORDER — TERBUTALINE SULFATE 1 MG/ML
0.25 INJECTION SUBCUTANEOUS
OUTPATIENT
Start: 2025-07-25

## 2025-07-25 RX ORDER — OXYTOCIN-SODIUM CHLORIDE 0.9% IV SOLN 30 UNIT/500ML 30-0.9/5 UT/ML-%
10 SOLUTION INTRAVENOUS ONCE AS NEEDED
OUTPATIENT
Start: 2025-07-25 | End: 2036-12-21

## 2025-07-25 RX ORDER — DIPHENOXYLATE HYDROCHLORIDE AND ATROPINE SULFATE 2.5; .025 MG/1; MG/1
2 TABLET ORAL EVERY 6 HOURS PRN
OUTPATIENT
Start: 2025-07-25

## 2025-07-25 RX ORDER — SODIUM CHLORIDE, SODIUM LACTATE, POTASSIUM CHLORIDE, CALCIUM CHLORIDE 600; 310; 30; 20 MG/100ML; MG/100ML; MG/100ML; MG/100ML
INJECTION, SOLUTION INTRAVENOUS CONTINUOUS
OUTPATIENT
Start: 2025-07-25

## 2025-07-25 RX ORDER — MUPIROCIN 20 MG/G
OINTMENT TOPICAL
OUTPATIENT
Start: 2025-07-25

## 2025-07-25 RX ORDER — MISOPROSTOL 200 UG/1
800 TABLET ORAL ONCE AS NEEDED
OUTPATIENT
Start: 2025-07-25

## 2025-07-25 RX ORDER — SIMETHICONE 80 MG
1 TABLET,CHEWABLE ORAL 4 TIMES DAILY PRN
OUTPATIENT
Start: 2025-07-25

## 2025-07-25 RX ORDER — CARBOPROST TROMETHAMINE 250 UG/ML
250 INJECTION, SOLUTION INTRAMUSCULAR
OUTPATIENT
Start: 2025-07-25

## 2025-07-25 RX ORDER — MISOPROSTOL 200 UG/1
800 TABLET ORAL ONCE AS NEEDED
OUTPATIENT
Start: 2025-07-25 | End: 2036-12-21

## 2025-07-28 ENCOUNTER — HOSPITAL ENCOUNTER (INPATIENT)
Facility: HOSPITAL | Age: 30
LOS: 2 days | Discharge: HOME | End: 2025-07-30
Attending: GENERAL PRACTICE | Admitting: GENERAL PRACTICE
Payer: COMMERCIAL

## 2025-07-28 ENCOUNTER — TELEPHONE (OUTPATIENT)
Dept: OBSTETRICS AND GYNECOLOGY | Facility: CLINIC | Age: 30
End: 2025-07-28

## 2025-07-28 DIAGNOSIS — Z3A.39 39 WEEKS GESTATION OF PREGNANCY: ICD-10-CM

## 2025-07-28 DIAGNOSIS — O09.90 SUPERVISION OF HIGH-RISK PREGNANCY: ICD-10-CM

## 2025-07-28 DIAGNOSIS — O41.1230 CHORIOAMNIONITIS IN THIRD TRIMESTER, SINGLE OR UNSPECIFIED FETUS: Primary | ICD-10-CM

## 2025-07-28 LAB
ABSOLUTE EOSINOPHIL (SMH): 0.06 K/UL
ABSOLUTE MONOCYTE (SMH): 0.8 K/UL (ref 0.3–1)
ABSOLUTE NEUTROPHIL COUNT (SMH): 7 K/UL (ref 1.8–7.7)
AMPHET UR QL SCN: NEGATIVE
BARBITURATE SCN PRESENT UR: NEGATIVE
BASOPHILS # BLD AUTO: 0.03 K/UL
BASOPHILS NFR BLD AUTO: 0.3 %
BENZODIAZ UR QL SCN: NEGATIVE
BUPRENORPHINE UR QL SCN: NEGATIVE
CANNABINOIDS UR QL SCN: NEGATIVE
COCAINE UR QL SCN: NEGATIVE
CREAT UR-MCNC: 66.6 MG/DL (ref 15–325)
ERYTHROCYTE [DISTWIDTH] IN BLOOD BY AUTOMATED COUNT: 14.5 % (ref 11.5–14.5)
FENTANYL UR QL SCN: NEGATIVE
GROUP & RH: NORMAL
HCT VFR BLD AUTO: 36.4 % (ref 37–48.5)
HGB BLD-MCNC: 11.8 GM/DL (ref 12–16)
IMM GRANULOCYTES # BLD AUTO: 0.09 K/UL (ref 0–0.04)
IMM GRANULOCYTES NFR BLD AUTO: 0.9 % (ref 0–0.5)
INDIRECT COOMBS: NORMAL
LYMPHOCYTES # BLD AUTO: 1.77 K/UL (ref 1–4.8)
MCH RBC QN AUTO: 28.6 PG (ref 27–31)
MCHC RBC AUTO-ENTMCNC: 32.4 G/DL (ref 32–36)
MCV RBC AUTO: 88 FL (ref 82–98)
NUCLEATED RBC (/100WBC) (SMH): 1 /100 WBC
OPIATES UR QL SCN: NEGATIVE
PCP UR QL: NEGATIVE
PLATELET # BLD AUTO: 212 K/UL (ref 150–450)
PMV BLD AUTO: 10.4 FL (ref 9.2–12.9)
RBC # BLD AUTO: 4.13 M/UL (ref 4–5.4)
RELATIVE EOSINOPHIL (SMH): 0.6 % (ref 0–8)
RELATIVE LYMPHOCYTE (SMH): 18.2 % (ref 18–48)
RELATIVE MONOCYTE (SMH): 8.2 % (ref 4–15)
RELATIVE NEUTROPHIL (SMH): 71.8 % (ref 38–73)
WBC # BLD AUTO: 9.73 K/UL (ref 3.9–12.7)

## 2025-07-28 PROCEDURE — 36415 COLL VENOUS BLD VENIPUNCTURE: CPT | Performed by: GENERAL PRACTICE

## 2025-07-28 PROCEDURE — 86593 SYPHILIS TEST NON-TREP QUANT: CPT | Performed by: GENERAL PRACTICE

## 2025-07-28 PROCEDURE — 99223 1ST HOSP IP/OBS HIGH 75: CPT | Mod: ,,, | Performed by: GENERAL PRACTICE

## 2025-07-28 PROCEDURE — 80307 DRUG TEST PRSMV CHEM ANLYZR: CPT | Performed by: GENERAL PRACTICE

## 2025-07-28 PROCEDURE — 86900 BLOOD TYPING SEROLOGIC ABO: CPT | Performed by: GENERAL PRACTICE

## 2025-07-28 PROCEDURE — 99211 OFF/OP EST MAY X REQ PHY/QHP: CPT

## 2025-07-28 PROCEDURE — 11000001 HC ACUTE MED/SURG PRIVATE ROOM

## 2025-07-28 PROCEDURE — 85025 COMPLETE CBC W/AUTO DIFF WBC: CPT | Performed by: GENERAL PRACTICE

## 2025-07-28 PROCEDURE — 3E033VJ INTRODUCTION OF OTHER HORMONE INTO PERIPHERAL VEIN, PERCUTANEOUS APPROACH: ICD-10-PCS | Performed by: GENERAL PRACTICE

## 2025-07-28 PROCEDURE — 63600175 PHARM REV CODE 636 W HCPCS: Performed by: GENERAL PRACTICE

## 2025-07-28 RX ORDER — ESCITALOPRAM OXALATE 10 MG/1
20 TABLET ORAL DAILY
Status: DISCONTINUED | OUTPATIENT
Start: 2025-07-29 | End: 2025-07-30 | Stop reason: HOSPADM

## 2025-07-28 RX ORDER — OXYTOCIN-SODIUM CHLORIDE 0.9% IV SOLN 30 UNIT/500ML 30-0.9/5 UT/ML-%
95 SOLUTION INTRAVENOUS CONTINUOUS PRN
OUTPATIENT
Start: 2025-07-28

## 2025-07-28 RX ORDER — SODIUM CHLORIDE, SODIUM LACTATE, POTASSIUM CHLORIDE, CALCIUM CHLORIDE 600; 310; 30; 20 MG/100ML; MG/100ML; MG/100ML; MG/100ML
INJECTION, SOLUTION INTRAVENOUS CONTINUOUS
Status: DISCONTINUED | OUTPATIENT
Start: 2025-07-28 | End: 2025-07-29

## 2025-07-28 RX ORDER — OXYTOCIN-SODIUM CHLORIDE 0.9% IV SOLN 30 UNIT/500ML 30-0.9/5 UT/ML-%
0-32 SOLUTION INTRAVENOUS CONTINUOUS
Status: DISCONTINUED | OUTPATIENT
Start: 2025-07-28 | End: 2025-07-29

## 2025-07-28 RX ORDER — LAMOTRIGINE 100 MG/1
200 TABLET ORAL DAILY
Status: DISCONTINUED | OUTPATIENT
Start: 2025-07-29 | End: 2025-07-30 | Stop reason: HOSPADM

## 2025-07-28 RX ORDER — OXYTOCIN-SODIUM CHLORIDE 0.9% IV SOLN 30 UNIT/500ML 30-0.9/5 UT/ML-%
10 SOLUTION INTRAVENOUS ONCE AS NEEDED
OUTPATIENT
Start: 2025-07-28 | End: 2036-12-24

## 2025-07-28 RX ADMIN — SODIUM CHLORIDE, POTASSIUM CHLORIDE, SODIUM LACTATE AND CALCIUM CHLORIDE: 600; 310; 30; 20 INJECTION, SOLUTION INTRAVENOUS at 06:07

## 2025-07-28 RX ADMIN — Medication 2 MILLI-UNITS/MIN: at 10:07

## 2025-07-28 RX ADMIN — SODIUM CHLORIDE, POTASSIUM CHLORIDE, SODIUM LACTATE AND CALCIUM CHLORIDE: 600; 310; 30; 20 INJECTION, SOLUTION INTRAVENOUS at 10:07

## 2025-07-28 NOTE — NURSING
1520- g1 @ 39.3  arrived to l&d with c/o ctx since 1pm that she rates a 6/10, pt denies problems with this preg, denies lof or bleeding, +FM, ccua collected, pt changed to robwgabby     1523- moniters applied to soft non-tender abd sve: 0/50/-3    1632- sve: 0.5/50/-3    1730-sve: 1/70/-3  notified orders rec'd to admit pt.

## 2025-07-28 NOTE — TELEPHONE ENCOUNTER
GA 39w 3d. Pt's dad called states pt has been having contractions for the last hour and they are about 6 min apart. Pt would rate it 7/10 pain. Explained to dad that Dr. Coates is out of the office today and tomorrow, but I would send a message to Dr. Cloud, but they should head to L&D for evaluation. Pt's dad voiced understanding.

## 2025-07-28 NOTE — H&P
Asher Saldana  29 y.o.  MRN  [id number] PATIENT   1995   FINAL EDC:   2025  by 8 wk US    Baby: Aye     Name: Avery ALLERGIES:    NKDA      GBS:  Negative  Date:  2025 OB PROBLEM LIST:    Depression/Anxiety/Bipolar    EFRA II (4:00 colpo bx; 7.10.23); Pap (1..25): ASCUS, +other HRHPV, colposcopy PP   TO-DO THROUGHOUT PREGNANCY:  Depression Screen: 3      Flu Shot: declined 25  TDAP: 5..  Infant feeding: breast   Plans for epidural: yes  Classes at hospital: ___  PP contraception: declined  Delivery Consent: 25      Pediatrician: Lyudmila Gu MEDICATIONS:  EScitalopram oxalate (LEXAPRO) 20 mg, Oral, Daily   famotidine (PEPCID) 20 mg, Oral, 2 times daily, Can increase to 2 tablets twice a day if needed.   lamoTRIgine (LAMICTAL) 200 mg, Oral, Daily   PRENAT VIT 17-IRON-FOLIC-OM3,6 ORAL 1 tablet, Daily   promethazine (PHENERGAN) 25 mg, Oral, Every 6 hours PRN      HISTORY AND PHYSICAL  2025    Primary OB Doctor: [facility] patient (Dr. Coates / Dr. Cloud)    SUBJECTIVE   Asher Saldana is a 29 y.o.  who presented to L&D on 2025 at 39w3d with complaint of painful, regular contractions.  While being observed on L&D she made change from closed to  over just a few hours.  No ROM or VB.  Active FM.  Would like to stay for augmentation of labor.    OBJECTIVE   122/76  89  98% on RA    GEN = alert/oriented, nad  HEENT = sclera anicteric, EOM grossly normal  BREASTS = deferred, no concerns  CV = BP and HR as per vitals  PULM = normal respiratory effort  ABD = soft, gravid, nontender, nondistended   = Cervix:  per RN @ 1730hrs, cephalic presentation    FHR: 135bpm baseline, moderate variability, spont accels, no decels  TOCO: q3-4 min    ADMISSION LABS:  pending    ASSESSMENT / PLAN  29 y.o.  at 39w3d in latent labor, desires admission for delivery and agrees to augmentation.  Reassuring fetal and maternal  status.    Informed consent previously obtained for delivery.  Antibiotics: not indicated  Lamictal and lexapro ordered  Anesthesia consult prn  Check again 2-4hrs after last check, sooner prn    Hillary Cloud MD       LABS   INITIAL LABS:    DATE: 12/7/24  MBT: O positive  AB SCREEN: negative  TP-ABS: negative  RUBELLA: Immune  Hep B sAg: negative  Hep C Ab: negative  HIV: negative  H/H: 14.1 / 40.4  PLT: 245  VARICELLA: Immune  HGB: normal  URINE CX: negative    PAP: ASCUS+other HRHPV / Date: 1.6.25    MARK/CHLAM/TRICH: negative / Date: 1.6.25   OTHER LABS:    DATE: 1/6/25  cfDNA ([id number]): XX, neg for T13, T18, T21   CARRIER SCREEN (Inheritest Core): declined     Lab Results   Component Value Date    WBC 9.69 05/05/2025    HGB 12.0 05/05/2025    HCT 37.7 05/05/2025     05/05/2025    MCV 95 05/05/2025    FERRITIN 13.0 (L) 05/05/2025     Lab Results   Component Value Date    OBGLUCOSESCR 101 05/05/2025     Lab Results   Component Value Date    TREPABIGMIGG Non-Reactive 05/05/2025      ULTRASOUNDS   ANATOMY SCAN:    DATE: 3/20/25 @ 20wks:  SEX: female  EFW: 377 g  ANATOMY: wnl but incomplete  PLACENTA: posterior  CERVIX: normal length  OTHER: repeat in 6 weeks    DATE: 5/5/2025 @ 27+3wks:  SIZE = DATES  ANATOMY: anatomy survey completed and wnl      HISTORY   MEDICAL HISTORY:    Pre-pregnancy BMI = 24  Depression/anxiety  PTSD  Bipolar  EFRA II   SURGICAL HISTORY:    none       OBSTETRIC HISTORY   Month/Year Mode of Delivery EGA Wt. M/F Epidural Complications / Comments   G1                                               SOCIAL HISTORY:    Smoker: non-smoker  Alcohol: yes but not since +HCG  Drugs: denies  Relationship:   Domestic Violence: no  Lives with:   Education Level: Undergraduate Degree  Occupation: [employer] nurse  Mandaeism: Christianity GYN HISTORY:  DYSPLASIA HISTORY:  7/10/2023: EFRA 2 @ 4  1/10/2025: ASCUS / HPV POS (-16/-18)   --> colposcopy postpartum    STD Hx: no past  history  GENITAL HSV: no FAMILY HISTORY:    HTN: yes (dad)  DIABETES: no  BLEEDING D/O: no  CLOTTING D/O: no  BIRTH DEFECTS: no  MENTAL DISABILITY: no  GYN CANCER: no

## 2025-07-29 ENCOUNTER — ANESTHESIA (OUTPATIENT)
Dept: OBSTETRICS AND GYNECOLOGY | Facility: HOSPITAL | Age: 30
End: 2025-07-29
Payer: COMMERCIAL

## 2025-07-29 ENCOUNTER — ANESTHESIA EVENT (OUTPATIENT)
Dept: OBSTETRICS AND GYNECOLOGY | Facility: HOSPITAL | Age: 30
End: 2025-07-29
Payer: COMMERCIAL

## 2025-07-29 PROBLEM — F17.200 TOBACCO DEPENDENCY: Status: ACTIVE | Noted: 2025-07-29

## 2025-07-29 LAB
ALLENS TEST: ABNORMAL
DELSYS: ABNORMAL
HCO3 UR-SCNC: 16.5 MMOL/L (ref 12–29)
MODE: ABNORMAL
PCO2 BLDA: 41.6 MMHG (ref 27–49)
PH SMN: 7.21 [PH] (ref 7.25–7.45)
PO2 BLDA: 25 MMHG (ref 17–41)
POC BE: -11 MMOL/L (ref -2–2)
POC SATURATED O2: 33 %
POC TCO2: 18 MMOL/L (ref 23–27)
SAMPLE: ABNORMAL
SITE: ABNORMAL
T PALLIDUM IGG+IGM SER QL: NORMAL

## 2025-07-29 PROCEDURE — 63600175 PHARM REV CODE 636 W HCPCS: Performed by: GENERAL PRACTICE

## 2025-07-29 PROCEDURE — 36416 COLLJ CAPILLARY BLOOD SPEC: CPT

## 2025-07-29 PROCEDURE — 99900035 HC TECH TIME PER 15 MIN (STAT)

## 2025-07-29 PROCEDURE — C1751 CATH, INF, PER/CENT/MIDLINE: HCPCS | Performed by: ANESTHESIOLOGY

## 2025-07-29 PROCEDURE — 99233 SBSQ HOSP IP/OBS HIGH 50: CPT | Mod: ,,, | Performed by: GENERAL PRACTICE

## 2025-07-29 PROCEDURE — 11000001 HC ACUTE MED/SURG PRIVATE ROOM

## 2025-07-29 PROCEDURE — 25000003 PHARM REV CODE 250: Performed by: GENERAL PRACTICE

## 2025-07-29 PROCEDURE — 63600175 PHARM REV CODE 636 W HCPCS: Performed by: ANESTHESIOLOGY

## 2025-07-29 PROCEDURE — 27200710 HC EPIDURAL INFUSION PUMP SET: Performed by: ANESTHESIOLOGY

## 2025-07-29 PROCEDURE — 0HQ9XZZ REPAIR PERINEUM SKIN, EXTERNAL APPROACH: ICD-10-PCS | Performed by: GENERAL PRACTICE

## 2025-07-29 PROCEDURE — 62326 NJX INTERLAMINAR LMBR/SAC: CPT | Performed by: ANESTHESIOLOGY

## 2025-07-29 PROCEDURE — 82803 BLOOD GASES ANY COMBINATION: CPT

## 2025-07-29 RX ORDER — IBUPROFEN 400 MG/1
800 TABLET, FILM COATED ORAL EVERY 8 HOURS PRN
Status: DISCONTINUED | OUTPATIENT
Start: 2025-07-29 | End: 2025-07-30 | Stop reason: HOSPADM

## 2025-07-29 RX ORDER — ROPIVACAINE HYDROCHLORIDE 5 MG/ML
20 INJECTION, SOLUTION EPIDURAL; INFILTRATION; PERINEURAL ONCE
Status: DISCONTINUED | OUTPATIENT
Start: 2025-07-29 | End: 2025-07-29

## 2025-07-29 RX ORDER — NALOXONE HCL 0.4 MG/ML
0.4 VIAL (ML) INJECTION SEE ADMIN INSTRUCTIONS
Status: DISCONTINUED | OUTPATIENT
Start: 2025-07-29 | End: 2025-07-29

## 2025-07-29 RX ORDER — HYDROCODONE BITARTRATE AND ACETAMINOPHEN 5; 325 MG/1; MG/1
1 TABLET ORAL EVERY 4 HOURS PRN
Status: DISCONTINUED | OUTPATIENT
Start: 2025-07-29 | End: 2025-07-30 | Stop reason: HOSPADM

## 2025-07-29 RX ORDER — FENTANYL/BUPIVACAINE/NS/PF 2MCG/ML-.1
10 PLASTIC BAG, INJECTION (ML) INJECTION CONTINUOUS
Refills: 0 | Status: DISCONTINUED | OUTPATIENT
Start: 2025-07-29 | End: 2025-07-29

## 2025-07-29 RX ORDER — OXYTOCIN-SODIUM CHLORIDE 0.9% IV SOLN 30 UNIT/500ML 30-0.9/5 UT/ML-%
95 SOLUTION INTRAVENOUS CONTINUOUS PRN
Status: DISCONTINUED | OUTPATIENT
Start: 2025-07-29 | End: 2025-07-30 | Stop reason: HOSPADM

## 2025-07-29 RX ORDER — EPHEDRINE SULFATE 50 MG/ML
10 INJECTION, SOLUTION INTRAVENOUS ONCE AS NEEDED
Status: DISCONTINUED | OUTPATIENT
Start: 2025-07-29 | End: 2025-07-29

## 2025-07-29 RX ORDER — DIPHENHYDRAMINE HYDROCHLORIDE 50 MG/ML
12.5 INJECTION, SOLUTION INTRAMUSCULAR; INTRAVENOUS EVERY 4 HOURS PRN
Status: DISCONTINUED | OUTPATIENT
Start: 2025-07-29 | End: 2025-07-29

## 2025-07-29 RX ORDER — ROPIVACAINE HYDROCHLORIDE 2 MG/ML
20 INJECTION, SOLUTION EPIDURAL; INFILTRATION ONCE AS NEEDED
Status: COMPLETED | OUTPATIENT
Start: 2025-07-29 | End: 2025-07-29

## 2025-07-29 RX ORDER — METHYLERGONOVINE MALEATE 0.2 MG/ML
200 INJECTION INTRAVENOUS ONCE
Status: COMPLETED | OUTPATIENT
Start: 2025-07-29 | End: 2025-07-29

## 2025-07-29 RX ORDER — EPHEDRINE SULFATE 50 MG/ML
10 INJECTION, SOLUTION INTRAVENOUS ONCE
Status: DISCONTINUED | OUTPATIENT
Start: 2025-07-29 | End: 2025-07-29

## 2025-07-29 RX ORDER — LIDOCAINE HYDROCHLORIDE AND EPINEPHRINE 15; 5 MG/ML; UG/ML
INJECTION, SOLUTION EPIDURAL
Status: DISCONTINUED | OUTPATIENT
Start: 2025-07-29 | End: 2025-07-29

## 2025-07-29 RX ORDER — FENTANYL/BUPIVACAINE/NS/PF 2MCG/ML-.1
14 PLASTIC BAG, INJECTION (ML) INJECTION CONTINUOUS
Refills: 0 | Status: DISCONTINUED | OUTPATIENT
Start: 2025-07-29 | End: 2025-07-29

## 2025-07-29 RX ORDER — ONDANSETRON HYDROCHLORIDE 2 MG/ML
4 INJECTION, SOLUTION INTRAVENOUS EVERY 6 HOURS PRN
Status: DISCONTINUED | OUTPATIENT
Start: 2025-07-29 | End: 2025-07-29

## 2025-07-29 RX ADMIN — LAMOTRIGINE 200 MG: 100 TABLET ORAL at 09:07

## 2025-07-29 RX ADMIN — ROPIVACAINE HYDROCHLORIDE 3 ML: 2 INJECTION EPIDURAL; INFILTRATION; PERINEURAL at 01:07

## 2025-07-29 RX ADMIN — GENTAMICIN SULFATE 304.4 MG: 40 INJECTION, SOLUTION INTRAMUSCULAR; INTRAVENOUS at 11:07

## 2025-07-29 RX ADMIN — AMPICILLIN SODIUM 2 G: 2 INJECTION, POWDER, FOR SOLUTION INTRAMUSCULAR; INTRAVENOUS at 09:07

## 2025-07-29 RX ADMIN — ESCITALOPRAM OXALATE 20 MG: 10 TABLET ORAL at 09:07

## 2025-07-29 RX ADMIN — METHYLERGONOVINE MALEATE 200 MCG: 0.2 INJECTION, SOLUTION INTRAMUSCULAR; INTRAVENOUS at 08:07

## 2025-07-29 RX ADMIN — OXYTOCIN-SODIUM CHLORIDE 0.9% IV SOLN 30 UNIT/500ML 95 MILLI-UNITS/MIN: 30-0.9/5 SOLUTION at 09:07

## 2025-07-29 RX ADMIN — LIDOCAINE HYDROCHLORIDE,EPINEPHRINE BITARTRATE 3 ML: 15; .005 INJECTION, SOLUTION EPIDURAL; INFILTRATION; INTRACAUDAL; PERINEURAL at 01:07

## 2025-07-29 RX ADMIN — ONDANSETRON 4 MG: 2 INJECTION INTRAMUSCULAR; INTRAVENOUS at 05:07

## 2025-07-29 RX ADMIN — MISOPROSTOL 50 MCG: 100 TABLET ORAL at 09:07

## 2025-07-29 RX ADMIN — ONDANSETRON 4 MG: 2 INJECTION INTRAMUSCULAR; INTRAVENOUS at 08:07

## 2025-07-29 RX ADMIN — SODIUM CHLORIDE, POTASSIUM CHLORIDE, SODIUM LACTATE AND CALCIUM CHLORIDE: 600; 310; 30; 20 INJECTION, SOLUTION INTRAVENOUS at 12:07

## 2025-07-29 RX ADMIN — IBUPROFEN 800 MG: 400 TABLET ORAL at 09:07

## 2025-07-29 NOTE — PROGRESS NOTES
Asher Saldana  29 y.o.  MRN  [id number] PATIENT   1995   FINAL EDC:   2025  by 8 wk US    Baby: Aye     Name: Avery ALLERGIES:    NKDA      GBS:  Negative  Date:  2025 OB PROBLEM LIST:    Depression/Anxiety/Bipolar    EFRA II (4:00 colpo bx; 7.10.23); Pap (1..25): ASCUS, +other HRHPV, colposcopy PP   TO-DO THROUGHOUT PREGNANCY:  Depression Screen: 3      Flu Shot: declined 25  TDAP: 5..  Infant feeding: breast   Plans for epidural: yes  Classes at hospital: ___  PP contraception: declined  Delivery Consent: 25      Pediatrician: Lyudmila Gu MEDICATIONS:  EScitalopram oxalate (LEXAPRO) 20 mg, Oral, Daily   famotidine (PEPCID) 20 mg, Oral, 2 times daily, Can increase to 2 tablets twice a day if needed.   lamoTRIgine (LAMICTAL) 200 mg, Oral, Daily   PRENAT VIT 17-IRON-FOLIC-OM3,6 ORAL 1 tablet, Daily   promethazine (PHENERGAN) 25 mg, Oral, Every 6 hours PRN      LABOR PROGRESS NOTE    Hospital Day: 1     Subjective  Coping well.  Agrees to amniotomy if amenable.    ---------------------------------  Objective  Vital Signs (Most Recent):  Temp: 98.2 °F (36.8 °C) (25)  Pulse: 101 (25)  Resp: 18 (25)  BP: 117/66 (25)  SpO2: 97 % (25) Vital Signs (24h Range):  Temp:  [98 °F (36.7 °C)-98.2 °F (36.8 °C)] 98.2 °F (36.8 °C)  Pulse:  [] 101  Resp:  [16-18] 18  SpO2:  [97 %-99 %] 97 %  BP: (117-122)/(66-76) 117/66     GEN = alert/oriented, nad  EFW 8lbs by palpation    CVX = 75/-3/cephalic    TOCO = q1-3  FHR    = 130bpm, moderate variability, spont accels, no decels    LABOR COURSE:  1730: CVX   2200: CVX unchanged, ordered pitocin    ----------------------------------    Assessment and Plan  29 y.o.  at 39w3d, who presented to L&D on 2025 at 39w3d with complaint of painful, regular contractions.  While being observed on L&D she made change from closed to  over just a few  hours.  Wanted to stay for augmentation of labor.  No cervical change since 1730hrs.    Start pitocin  Check again ~4hrs, sooner prn  Epidural when desired    Hillary Cloud MD       LABS   INITIAL LABS:    DATE: 12/7/24  MBT: O positive  AB SCREEN: negative  TP-ABS: negative  RUBELLA: Immune  Hep B sAg: negative  Hep C Ab: negative  HIV: negative  H/H: 14.1 / 40.4  PLT: 245  VARICELLA: Immune  HGB: normal  URINE CX: negative    PAP: ASCUS+other HRHPV / Date: 1.6.25    MARK/CHLAM/TRICH: negative / Date: 1.6.25   OTHER LABS:    DATE: 1/6/25  cfDNA ([id number]): XX, neg for T13, T18, T21   CARRIER SCREEN (Inheritest Core): declined     Lab Results   Component Value Date    WBC 9.69 05/05/2025    HGB 12.0 05/05/2025    HCT 37.7 05/05/2025     05/05/2025    MCV 95 05/05/2025    FERRITIN 13.0 (L) 05/05/2025     Lab Results   Component Value Date    OBGLUCOSESCR 101 05/05/2025     Lab Results   Component Value Date    TREPABIGMIGG Non-Reactive 05/05/2025      ULTRASOUNDS   ANATOMY SCAN:    DATE: 3/20/25 @ 20wks:  SEX: female  EFW: 377 g  ANATOMY: wnl but incomplete  PLACENTA: posterior  CERVIX: normal length  OTHER: repeat in 6 weeks    DATE: 5/5/2025 @ 27+3wks:  SIZE = DATES  ANATOMY: anatomy survey completed and wnl      HISTORY   MEDICAL HISTORY:    Pre-pregnancy BMI = 24  Depression/anxiety  PTSD  Bipolar  EFRA II   SURGICAL HISTORY:    none       OBSTETRIC HISTORY   Month/Year Mode of Delivery EGA Wt. M/F Epidural Complications / Comments   G1                                               SOCIAL HISTORY:    Smoker: non-smoker  Alcohol: yes but not since +HCG  Drugs: denies  Relationship:   Domestic Violence: no  Lives with:   Education Level: Undergraduate Degree  Occupation: [employer] nurse  Islam: [employer] GYN HISTORY:  DYSPLASIA HISTORY:  7/10/2023: EFRA 2 @ 4  1/10/2025: ASCUS / HPV POS (-16/-18)   --> colposcopy postpartum    STD Hx: no past history  GENITAL HSV: no FAMILY  HISTORY:    HTN: yes (dad)  DIABETES: no  BLEEDING D/O: no  CLOTTING D/O: no  BIRTH DEFECTS: no  MENTAL DISABILITY: no  GYN CANCER: no

## 2025-07-29 NOTE — PLAN OF CARE
Problem: Adult Inpatient Plan of Care  Goal: Plan of Care Review  Outcome: Progressing  Goal: Patient-Specific Goal (Individualized)  Outcome: Progressing  Goal: Absence of Hospital-Acquired Illness or Injury  Outcome: Progressing  Goal: Optimal Comfort and Wellbeing  Outcome: Progressing  Goal: Readiness for Transition of Care  Outcome: Progressing     Problem:  Fall Injury Risk  Goal: Absence of Fall, Infant Drop and Related Injury  Outcome: Progressing     Problem: Labor  Goal: Hemostasis  Outcome: Progressing  Goal: Stable Fetal Wellbeing  Outcome: Progressing  Goal: Effective Progression to Delivery  Outcome: Progressing  Goal: Absence of Infection Signs and Symptoms  Outcome: Progressing  Goal: Acceptable Pain Control  Outcome: Progressing  Goal: Normal Uterine Contraction Pattern  Outcome: Progressing

## 2025-07-29 NOTE — PROGRESS NOTES
Asher Saldana  29 y.o.  MRN  [id number] PATIENT   1995   FINAL EDC:   2025  by 8 wk US    Baby: Aye     Name: Avery ALLERGIES:    NKDA      GBS:  Negative  Date:  2025 OB PROBLEM LIST:    Depression/Anxiety/Bipolar    EFRA II (4:00 colpo bx; 7.10.23); Pap (1..25): ASCUS, +other HRHPV, colposcopy PP   TO-DO THROUGHOUT PREGNANCY:  Depression Screen: 3.13.25      Flu Shot: declined 25  TDAP: 5  Infant feeding: breast   Plans for epidural: yes  Classes at hospital: ___  PP contraception: declined  Delivery Consent: 25      Pediatrician: Lyudmila Gu MEDICATIONS:  EScitalopram oxalate (LEXAPRO) 20 mg, Oral, Daily   famotidine (PEPCID) 20 mg, Oral, 2 times daily, Can increase to 2 tablets twice a day if needed.   lamoTRIgine (LAMICTAL) 200 mg, Oral, Daily   PRENAT VIT 17-IRON-FOLIC-OM3,6 ORAL 1 tablet, Daily   promethazine (PHENERGAN) 25 mg, Oral, Every 6 hours PRN      LABOR PROGRESS NOTE    Hospital Day: 2     Subjective  Comfortable with epidural.    ---------------------------------  Objective  Vital Signs (Most Recent):  Temp: 97.9 °F (36.6 °C) (25 0435)  Pulse: 82 (25 0610)  Resp: 18 (25 0435)  BP: 120/76 (25 0556)  SpO2: 99 % (25 0610) Vital Signs (24h Range):  Temp:  [97.9 °F (36.6 °C)-98.2 °F (36.8 °C)] 97.9 °F (36.6 °C)  Pulse:  [0-110] 82  Resp:  [16-18] 18  SpO2:  [91 %-100 %] 99 %  BP: ()/(57-81) 120/76     GEN = alert/oriented, resting  EFW 8lbs by palpation    CVX = 3/90/-2 --> with patient's permission IUPC placed easily    TOCO = q5 min, pitocin @ 20mU/min  FHR    = 125bpm, moderate variability, spont accels, no decels    LABOR COURSE:  1730: CVX   2200: CVX unchanged, ordered pitocin  0115: epidural  0200: 3cm per RN, OM    ----------------------------------    Assessment and Plan  29 y.o.  at 39w4d , who presented to L&D on 2025 at 39w3d with complaint of painful, regular  contractions.  While being observed on L&D she made change from closed to 1/70 over just a few hours.  Wanted to stay for augmentation of labor.    Labor course as above.  Latent labor.  Reassuring fetal and maternal status.  Due to relatively high pitocin requirement placed IUPC.    Continue to titrate pitocin as indicated / tolerated  Check again ~4hrs, sooner prn    Hillary Cloud MD       LABS   INITIAL LABS:    DATE: 12/7/24  MBT: O positive  AB SCREEN: negative  TP-ABS: negative  RUBELLA: Immune  Hep B sAg: negative  Hep C Ab: negative  HIV: negative  H/H: 14.1 / 40.4  PLT: 245  VARICELLA: Immune  HGB: normal  URINE CX: negative    PAP: ASCUS+other HRHPV / Date: 1.6.25    MARK/CHLAM/TRICH: negative / Date: 1.6.25   OTHER LABS:    DATE: 1/6/25  cfDNA ([id number]): XX, neg for T13, T18, T21   CARRIER SCREEN (Inheritest Core): declined     Lab Results   Component Value Date    WBC 9.69 05/05/2025    HGB 12.0 05/05/2025    HCT 37.7 05/05/2025     05/05/2025    MCV 95 05/05/2025    FERRITIN 13.0 (L) 05/05/2025     Lab Results   Component Value Date    OBGLUCOSESCR 101 05/05/2025     Lab Results   Component Value Date    TREPABIGMIGG Non-Reactive 05/05/2025      ULTRASOUNDS   ANATOMY SCAN:    DATE: 3/20/25 @ 20wks:  SEX: female  EFW: 377 g  ANATOMY: wnl but incomplete  PLACENTA: posterior  CERVIX: normal length  OTHER: repeat in 6 weeks    DATE: 5/5/2025 @ 27+3wks:  SIZE = DATES  ANATOMY: anatomy survey completed and wnl      HISTORY   MEDICAL HISTORY:    Pre-pregnancy BMI = 24  Depression/anxiety  PTSD  Bipolar  EFRA II   SURGICAL HISTORY:    none       OBSTETRIC HISTORY   Month/Year Mode of Delivery EGA Wt. 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

## 2025-07-29 NOTE — ANESTHESIA PROCEDURE NOTES
Epidural    Patient location during procedure: OB   Reason for block: primary anesthetic   Reason for block: labor analgesia requested by patient and obstetrician  Diagnosis: IUP   Start time: 7/29/2025 1:04 AM  Timeout: 7/29/2025 1:04 AM  End time: 7/29/2025 1:14 AM    Staffing  Performing Provider: Keith Pablo MD  Authorizing Provider: Keith Pablo MD    Staffing  Performed by: Keith Pablo MD  Authorized by: Keith Pablo MD        Preanesthetic Checklist  Completed: patient identified, IV checked, site marked, risks and benefits discussed, surgical consent, monitors and equipment checked, pre-op evaluation, timeout performed, anesthesia consent given, hand hygiene performed and patient being monitored  Preparation  Patient position: sitting  Prep: Betadine  Patient monitoring: ECG, Pulse Ox and Blood Pressure  Reason for block: primary anesthetic   Epidural  Skin Anesthetic: lidocaine 1%  Skin Wheal: 5 mL  Administration type: continuous  Approach: midline  Interspace: L3-4    Injection technique: MICHAEL air  Needle and Epidural Catheter  Needle type: Tuohy   Needle gauge: 17  Needle length: 3.5 inches  Needle insertion depth: 5 cm  Catheter type: multi-orifice and springwound  Catheter size: 19 G  Catheter at skin depth: 9 cm  Insertion Attempts: 1  Test dose: 3 mL of lidocaine 1.5% with Epi 1-to-200,000  Additional Documentation: incremental injection, negative aspiration for heme and CSF, no paresthesia on injection, no signs/symptoms of IV or SA injection, no significant complaints from patient and no significant pain on injection  Needle localization: anatomical landmarks  Medications:  Volume per aspiration: 3 mL  Time between aspirations: 1 minutes   Assessment  Ease of block: easy  Patient's tolerance of the procedure: comfortable throughout block and no complaints  Additional Notes  The patient was ID and examined with chart and labs reviewed. The risk benefits alternatives to the epidural  "were discussed with the patient with all questions answered and the patient asked if she desired the epidural and she responded "yes". The consents were signed and a timeout performed.      No inadvertent dural puncture with Tuohy.  Dural puncture not performed with spinal needle              "

## 2025-07-29 NOTE — PROGRESS NOTES
Asher Saldana  29 y.o.  MRN  [id number] PATIENT   1995   FINAL EDC:   2025  by 8 wk US    Baby: Aye     Name: Avery ALLERGIES:    NKDA      GBS:  Negative  Date:  2025 OB PROBLEM LIST:    Depression/Anxiety/Bipolar    EFRA II (4:00 colpo bx; 7.10.23); Pap (1..25): ASCUS, +other HRHPV, colposcopy PP   TO-DO THROUGHOUT PREGNANCY:  Depression Screen: 3.13.25      Flu Shot: declined 25  TDAP: 5..  Infant feeding: breast   Plans for epidural: yes  Classes at hospital: ___  PP contraception: declined  Delivery Consent: 25      Pediatrician: Lyudmila Gu MEDICATIONS:  EScitalopram oxalate (LEXAPRO) 20 mg, Oral, Daily   famotidine (PEPCID) 20 mg, Oral, 2 times daily, Can increase to 2 tablets twice a day if needed.   lamoTRIgine (LAMICTAL) 200 mg, Oral, Daily   PRENAT VIT 17-IRON-FOLIC-OM3,6 ORAL 1 tablet, Daily   promethazine (PHENERGAN) 25 mg, Oral, Every 6 hours PRN      LABOR PROGRESS NOTE    Hospital Day: 2     Subjective  Sleeping.    ---------------------------------  Objective  Vital Signs (Most Recent):  Temp: 98.1 °F (36.7 °C) (25 0712)  Pulse: 84 (25 0800)  Resp: 17 (25 0712)  BP: 112/61 (25 0800)  SpO2: 95 % (25 0800) Vital Signs (24h Range):  Temp:  [97.9 °F (36.6 °C)-98.2 °F (36.8 °C)] 98.1 °F (36.7 °C)  Pulse:  [0-110] 84  Resp:  [16-18] 17  SpO2:  [91 %-100 %] 95 %  BP: ()/(57-81) 112/61     GEN = easily awakened  EFW 8lbs by palpation    TOCO = q4-6 min, pitocin @ 20mU/min  FHR    = 120bpm, moderate variability, spont accels, no decels    LABOR COURSE:  1730: CVX   2200: CVX unchanged, ordered pitocin  0115: epidural  0200: 3cm per RN, SROM  0830: pitocin off    ----------------------------------    Assessment and Plan  29 y.o.  at 39w4d , who presented to L&D on 2025 at 39w3d with complaint of painful, regular contractions.  While being observed on L&D she made change from closed to   over just a few hours.  Wanted to stay for augmentation of labor.    Labor course as above.  Latent labor.  Reassuring fetal and maternal status.  Contraction pattern remains irregular and inadequate even on 20mU/min of pitocin.    Stop pitocin  Cytotec 50mcg po x 1 at 0900hrs  Patient agrees to this plan    Hillary Cloud MD       LABS   INITIAL LABS:    DATE: 12/7/24  MBT: O positive  AB SCREEN: negative  TP-ABS: negative  RUBELLA: Immune  Hep B sAg: negative  Hep C Ab: negative  HIV: negative  H/H: 14.1 / 40.4  PLT: 245  VARICELLA: Immune  HGB: normal  URINE CX: negative    PAP: ASCUS+other HRHPV / Date: 1.6.25    MARK/CHLAM/TRICH: negative / Date: 1.6.25   OTHER LABS:    DATE: 1/6/25  cfDNA ([id number]): XX, neg for T13, T18, T21   CARRIER SCREEN (Inheritest Core): declined     Lab Results   Component Value Date    WBC 9.69 05/05/2025    HGB 12.0 05/05/2025    HCT 37.7 05/05/2025     05/05/2025    MCV 95 05/05/2025    FERRITIN 13.0 (L) 05/05/2025     Lab Results   Component Value Date    OBGLUCOSESCR 101 05/05/2025     Lab Results   Component Value Date    TREPABIGMIGG Non-Reactive 05/05/2025      ULTRASOUNDS   ANATOMY SCAN:    DATE: 3/20/25 @ 20wks:  SEX: female  EFW: 377 g  ANATOMY: wnl but incomplete  PLACENTA: posterior  CERVIX: normal length  OTHER: repeat in 6 weeks    DATE: 5/5/2025 @ 27+3wks:  SIZE = DATES  ANATOMY: anatomy survey completed and wnl      HISTORY   MEDICAL HISTORY:    Pre-pregnancy BMI = 24  Depression/anxiety  PTSD  Bipolar  EFRA II   SURGICAL HISTORY:    none       OBSTETRIC HISTORY   Month/Year Mode of Delivery EGA Wt. M/F Epidural Complications / Comments   G1                                               SOCIAL HISTORY:    Smoker: non-smoker  Alcohol: yes but not since +HCG  Drugs: denies  Relationship:   Domestic Violence: no  Lives with:   Education Level: Undergraduate Degree  Occupation: [employer] nurse  Sabianist: [employer]anda[employer] GYN HISTORY:  DYSPLASIA  HISTORY:  7/10/2023: EFRA 2 @ 4  1/10/2025: ASCUS / HPV POS (-16/-18)   --> colposcopy postpartum    STD Hx: no past history  GENITAL HSV: no FAMILY HISTORY:    HTN: yes (dad)  DIABETES: no  BLEEDING D/O: no  CLOTTING D/O: no  BIRTH DEFECTS: no  MENTAL DISABILITY: no  GYN CANCER: no

## 2025-07-29 NOTE — PROGRESS NOTES
Asher Saldana  29 y.o.  MRN  [id number] PATIENT   1995   FINAL EDC:   2025  by 8 wk US    Baby: Aye     Name: Aveyr ALLERGIES:    NKDA      GBS:  Negative  Date:  2025 OB PROBLEM LIST:    Depression/Anxiety/Bipolar    EFRA II (4:00 colpo bx; 7.10.23); Pap (1..25): ASCUS, +other HRHPV, colposcopy PP   TO-DO THROUGHOUT PREGNANCY:  Depression Screen: 3.13.25      Flu Shot: declined 25  TDAP: 5..  Infant feeding: breast   Plans for epidural: yes  Classes at hospital: ___  PP contraception: declined  Delivery Consent: 25      Pediatrician: Lyudmila Gu MEDICATIONS:  EScitalopram oxalate (LEXAPRO) 20 mg, Oral, Daily   famotidine (PEPCID) 20 mg, Oral, 2 times daily, Can increase to 2 tablets twice a day if needed.   lamoTRIgine (LAMICTAL) 200 mg, Oral, Daily   PRENAT VIT 17-IRON-FOLIC-OM3,6 ORAL 1 tablet, Daily   promethazine (PHENERGAN) 25 mg, Oral, Every 6 hours PRN      LABOR PROGRESS NOTE    Hospital Day: 2     Subjective  Resting, comfortable again after epidural bolus.    ---------------------------------  Objective  Vital Signs (Most Recent):  Temp: 99.2 °F (37.3 °C) (25 1141)  Pulse: 97 (25 1200)  Resp: 16 (25 0956)  BP: 120/74 (25 1200)  SpO2: 96 % (25 1200) Vital Signs (24h Range):  Temp:  [97.9 °F (36.6 °C)-99.2 °F (37.3 °C)] 99.2 °F (37.3 °C)  Pulse:  [0-110] 97  Resp:  [16-18] 16  SpO2:  [91 %-100 %] 96 %  BP: ()/(50-81) 120/74     GEN = sitting up in bed, cheerful  EFW 8lbs by palpation    TOCO = q2-3 min (from cytotec only)  FHR    = 145 bpm, moderate variability, spont accels, no decels    LABOR COURSE:  1730: CVX   2200: CVX unchanged, ordered pitocin  0115: epidural  0200: 3cm per RN, SROM  0630: CVX 3/-2 and IUPC placed (pit @ 20)  0830: pitocin off d/t dysfunctional pattern @ 20  0900: cytotec 50mcg po  ----------------------------------    Assessment and Plan  29 y.o.  at 39w4d , who  presented to L&D on 07/28/2025 at 39w3d with complaint of painful, regular contractions.  While being observed on L&D she made change from closed to 1/70 over just a few hours.  Wanted to stay for augmentation of labor.    Labor course as above.  Continues in latent labor.  Reassuring fetal and maternal status.  Nice response to cytotec.    If contractions diminish will resume pitocin  Continue to monitor  Check again 4hrs, sooner hollien    Hillary Cloud MD       LABS   INITIAL LABS:    DATE: 12/7/24  MBT: O positive  AB SCREEN: negative  TP-ABS: negative  RUBELLA: Immune  Hep B sAg: negative  Hep C Ab: negative  HIV: negative  H/H: 14.1 / 40.4  PLT: 245  VARICELLA: Immune  HGB: normal  URINE CX: negative    PAP: ASCUS+other HRHPV / Date: 1.6.25    MARK/CHLAM/TRICH: negative / Date: 1.6.25   OTHER LABS:    DATE: 1/6/25  cfDNA ([id number]): XX, neg for T13, T18, T21   CARRIER SCREEN (Inheritest Core): declined     Lab Results   Component Value Date    WBC 9.69 05/05/2025    HGB 12.0 05/05/2025    HCT 37.7 05/05/2025     05/05/2025    MCV 95 05/05/2025    FERRITIN 13.0 (L) 05/05/2025     Lab Results   Component Value Date    OBGLUCOSESCR 101 05/05/2025     Lab Results   Component Value Date    TREPABIGMIGG Non-Reactive 05/05/2025      ULTRASOUNDS   ANATOMY SCAN:    DATE: 3/20/25 @ 20wks:  SEX: female  EFW: 377 g  ANATOMY: wnl but incomplete  PLACENTA: posterior  CERVIX: normal length  OTHER: repeat in 6 weeks    DATE: 5/5/2025 @ 27+3wks:  SIZE = DATES  ANATOMY: anatomy survey completed and wnl      HISTORY   MEDICAL HISTORY:    Pre-pregnancy BMI = 24  Depression/anxiety  PTSD  Bipolar  EFRA II   SURGICAL HISTORY:    none       OBSTETRIC HISTORY   Month/Year Mode of Delivery EGA Wt. M/F Epidural Complications / Comments   G1                                               SOCIAL HISTORY:    Smoker: non-smoker  Alcohol: yes but not since +HCG  Drugs: denies  Relationship:   Domestic Violence: no  Lives with:    Education Level: Undergraduate Degree  Occupation: [employer] nurse  Episcopalian: Sikh GYN HISTORY:  DYSPLASIA HISTORY:  7/10/2023: EFRA 2 @ 4  1/10/2025: ASCUS / HPV POS (-16/-18)   --> colposcopy postpartum    STD Hx: no past history  GENITAL HSV: no FAMILY HISTORY:    HTN: yes (dad)  DIABETES: no  BLEEDING D/O: no  CLOTTING D/O: no  BIRTH DEFECTS: no  MENTAL DISABILITY: no  GYN CANCER: no

## 2025-07-29 NOTE — ANESTHESIA PREPROCEDURE EVALUATION
07/29/2025  Asher Saldana is a 29 y.o., female.           Lab Results   Component Value Date    WBC 9.73 07/28/2025    HGB 11.8 (L) 07/28/2025    HCT 36.4 (L) 07/28/2025    MCV 88 07/28/2025     07/28/2025         Pre-op Assessment    I have reviewed the Patient Summary Reports.     I have reviewed the Nursing Notes. I have reviewed the NPO Status.   I have reviewed the Medications.     Review of Systems  Anesthesia Hx:  No problems with previous Anesthesia             Denies Family Hx of Anesthesia complications.    Denies Personal Hx of Anesthesia complications.                    Social:  Smoker, Alcohol Use       Hematology/Oncology:    Oncology Normal    -- Anemia:                                  EENT/Dental:  EENT/Dental Normal           Cardiovascular:  Cardiovascular Normal                                              Pulmonary:    Asthma                    Renal/:  Renal/ Normal                 Hepatic/GI:  Hepatic/GI Normal                    Musculoskeletal:     Spina bifida       Spine Disorders:  Chronic Pain           Neurological:      Headaches     Spina bifida - patient reports normal motor and sensory to her lower extremities at this time                            Endocrine:  Endocrine Normal            Psych:  Psychiatric History anxiety        Psychotic Disorder and Bipolar disorder.          Physical Exam  General: Well nourished, Cooperative, Alert and Oriented    Airway:  Mallampati: III   Mouth Opening: Normal  TM Distance: > 6 cm  Tongue: Normal  Neck ROM: Normal ROM    Chest/Lungs:  Clear to auscultation, Normal Respiratory Rate    Heart:  Rate: Normal        Anesthesia Plan  Type of Anesthesia, risks & benefits discussed:    Anesthesia Type: Epidural  Intra-op Monitoring Plan: Standard ASA Monitors  Informed Consent: Informed consent signed with the Patient  and all parties understand the risks and agree with anesthesia plan.  All questions answered.   ASA Score: 2  Anesthesia Plan Notes:     Labor Epidural     Ready For Surgery From Anesthesia Perspective.     .

## 2025-07-30 ENCOUNTER — PATIENT MESSAGE (OUTPATIENT)
Dept: OBSTETRICS AND GYNECOLOGY | Facility: CLINIC | Age: 30
End: 2025-07-30
Payer: COMMERCIAL

## 2025-07-30 VITALS
BODY MASS INDEX: 36.32 KG/M2 | SYSTOLIC BLOOD PRESSURE: 106 MMHG | DIASTOLIC BLOOD PRESSURE: 71 MMHG | TEMPERATURE: 98 F | WEIGHT: 185 LBS | RESPIRATION RATE: 17 BRPM | HEIGHT: 60 IN | HEART RATE: 88 BPM | OXYGEN SATURATION: 97 %

## 2025-07-30 PROBLEM — O09.90 SUPERVISION OF HIGH RISK PREGNANCY, ANTEPARTUM: Status: ACTIVE | Noted: 2025-07-30

## 2025-07-30 LAB
ABSOLUTE EOSINOPHIL (SMH): 0.02 K/UL
ABSOLUTE MONOCYTE (SMH): 1.24 K/UL (ref 0.3–1)
ABSOLUTE NEUTROPHIL COUNT (SMH): 12.8 K/UL (ref 1.8–7.7)
BASOPHILS # BLD AUTO: 0.01 K/UL
BASOPHILS NFR BLD AUTO: 0.1 %
ERYTHROCYTE [DISTWIDTH] IN BLOOD BY AUTOMATED COUNT: 14.8 % (ref 11.5–14.5)
HCT VFR BLD AUTO: 33.9 % (ref 37–48.5)
HGB BLD-MCNC: 11.2 GM/DL (ref 12–16)
IMM GRANULOCYTES # BLD AUTO: 0.06 K/UL (ref 0–0.04)
IMM GRANULOCYTES NFR BLD AUTO: 0.4 % (ref 0–0.5)
LYMPHOCYTES # BLD AUTO: 1.06 K/UL (ref 1–4.8)
MCH RBC QN AUTO: 28.9 PG (ref 27–31)
MCHC RBC AUTO-ENTMCNC: 33 G/DL (ref 32–36)
MCV RBC AUTO: 88 FL (ref 82–98)
NUCLEATED RBC (/100WBC) (SMH): 0 /100 WBC
PLATELET # BLD AUTO: 154 K/UL (ref 150–450)
PMV BLD AUTO: 10.2 FL (ref 9.2–12.9)
RBC # BLD AUTO: 3.87 M/UL (ref 4–5.4)
RELATIVE EOSINOPHIL (SMH): 0.1 % (ref 0–8)
RELATIVE LYMPHOCYTE (SMH): 7 % (ref 18–48)
RELATIVE MONOCYTE (SMH): 8.1 % (ref 4–15)
RELATIVE NEUTROPHIL (SMH): 84.3 % (ref 38–73)
WBC # BLD AUTO: 15.23 K/UL (ref 3.9–12.7)

## 2025-07-30 PROCEDURE — 63600175 PHARM REV CODE 636 W HCPCS: Performed by: GENERAL PRACTICE

## 2025-07-30 PROCEDURE — 99239 HOSP IP/OBS DSCHRG MGMT >30: CPT | Mod: ,,, | Performed by: GENERAL PRACTICE

## 2025-07-30 PROCEDURE — 72200005 HC VAGINAL DELIVERY LEVEL II

## 2025-07-30 PROCEDURE — 36415 COLL VENOUS BLD VENIPUNCTURE: CPT | Performed by: GENERAL PRACTICE

## 2025-07-30 PROCEDURE — 85025 COMPLETE CBC W/AUTO DIFF WBC: CPT | Performed by: GENERAL PRACTICE

## 2025-07-30 PROCEDURE — 25000003 PHARM REV CODE 250: Performed by: GENERAL PRACTICE

## 2025-07-30 RX ORDER — MISOPROSTOL 200 UG/1
800 TABLET ORAL ONCE AS NEEDED
Status: DISCONTINUED | OUTPATIENT
Start: 2025-07-30 | End: 2025-07-30 | Stop reason: HOSPADM

## 2025-07-30 RX ORDER — CARBOPROST TROMETHAMINE 250 UG/ML
250 INJECTION, SOLUTION INTRAMUSCULAR
Status: DISCONTINUED | OUTPATIENT
Start: 2025-07-30 | End: 2025-07-30 | Stop reason: HOSPADM

## 2025-07-30 RX ORDER — TRANEXAMIC ACID 10 MG/ML
1000 INJECTION, SOLUTION INTRAVENOUS EVERY 30 MIN PRN
Status: DISCONTINUED | OUTPATIENT
Start: 2025-07-30 | End: 2025-07-30 | Stop reason: HOSPADM

## 2025-07-30 RX ORDER — AMOXICILLIN 250 MG
1 CAPSULE ORAL DAILY PRN
Status: DISCONTINUED | OUTPATIENT
Start: 2025-07-30 | End: 2025-07-30 | Stop reason: HOSPADM

## 2025-07-30 RX ORDER — METHYLERGONOVINE MALEATE 0.2 MG/ML
200 INJECTION INTRAVENOUS ONCE AS NEEDED
Status: DISCONTINUED | OUTPATIENT
Start: 2025-07-30 | End: 2025-07-30 | Stop reason: HOSPADM

## 2025-07-30 RX ORDER — HYDROCORTISONE 25 MG/G
CREAM TOPICAL 3 TIMES DAILY PRN
Status: DISCONTINUED | OUTPATIENT
Start: 2025-07-30 | End: 2025-07-30 | Stop reason: HOSPADM

## 2025-07-30 RX ORDER — HYDROCODONE BITARTRATE AND ACETAMINOPHEN 5; 325 MG/1; MG/1
1 TABLET ORAL EVERY 4 HOURS PRN
Qty: 10 TABLET | Refills: 0 | Status: SHIPPED | OUTPATIENT
Start: 2025-07-30

## 2025-07-30 RX ORDER — DIPHENHYDRAMINE HCL 25 MG
25 CAPSULE ORAL EVERY 4 HOURS PRN
Status: DISCONTINUED | OUTPATIENT
Start: 2025-07-30 | End: 2025-07-30 | Stop reason: HOSPADM

## 2025-07-30 RX ORDER — OXYTOCIN-SODIUM CHLORIDE 0.9% IV SOLN 30 UNIT/500ML 30-0.9/5 UT/ML-%
95 SOLUTION INTRAVENOUS CONTINUOUS PRN
Status: DISCONTINUED | OUTPATIENT
Start: 2025-07-30 | End: 2025-07-30 | Stop reason: HOSPADM

## 2025-07-30 RX ORDER — OXYTOCIN-SODIUM CHLORIDE 0.9% IV SOLN 30 UNIT/500ML 30-0.9/5 UT/ML-%
30 SOLUTION INTRAVENOUS ONCE AS NEEDED
Status: DISCONTINUED | OUTPATIENT
Start: 2025-07-30 | End: 2025-07-30 | Stop reason: HOSPADM

## 2025-07-30 RX ORDER — SODIUM CHLORIDE 0.9 % (FLUSH) 0.9 %
10 SYRINGE (ML) INJECTION
Status: DISCONTINUED | OUTPATIENT
Start: 2025-07-30 | End: 2025-07-30 | Stop reason: HOSPADM

## 2025-07-30 RX ORDER — OXYTOCIN 10 [USP'U]/ML
10 INJECTION, SOLUTION INTRAMUSCULAR; INTRAVENOUS ONCE AS NEEDED
Status: DISCONTINUED | OUTPATIENT
Start: 2025-07-30 | End: 2025-07-30 | Stop reason: HOSPADM

## 2025-07-30 RX ORDER — PRENATAL WITH FERROUS FUM AND FOLIC ACID 3080; 920; 120; 400; 22; 1.84; 3; 20; 10; 1; 12; 200; 27; 25; 2 [IU]/1; [IU]/1; MG/1; [IU]/1; MG/1; MG/1; MG/1; MG/1; MG/1; MG/1; UG/1; MG/1; MG/1; MG/1; MG/1
1 TABLET ORAL DAILY
Status: DISCONTINUED | OUTPATIENT
Start: 2025-07-30 | End: 2025-07-30 | Stop reason: HOSPADM

## 2025-07-30 RX ORDER — DIPHENOXYLATE HYDROCHLORIDE AND ATROPINE SULFATE 2.5; .025 MG/1; MG/1
2 TABLET ORAL EVERY 6 HOURS PRN
Status: DISCONTINUED | OUTPATIENT
Start: 2025-07-30 | End: 2025-07-30 | Stop reason: HOSPADM

## 2025-07-30 RX ORDER — IBUPROFEN 800 MG/1
800 TABLET, FILM COATED ORAL EVERY 8 HOURS PRN
Qty: 30 TABLET | Refills: 0 | Status: SHIPPED | OUTPATIENT
Start: 2025-07-30

## 2025-07-30 RX ORDER — ONDANSETRON 4 MG/1
8 TABLET, ORALLY DISINTEGRATING ORAL EVERY 8 HOURS PRN
Status: DISCONTINUED | OUTPATIENT
Start: 2025-07-30 | End: 2025-07-30 | Stop reason: HOSPADM

## 2025-07-30 RX ORDER — AMOXICILLIN 250 MG
1 CAPSULE ORAL DAILY PRN
Qty: 30 TABLET | Refills: 0 | Status: SHIPPED | OUTPATIENT
Start: 2025-07-30

## 2025-07-30 RX ORDER — OXYTOCIN-SODIUM CHLORIDE 0.9% IV SOLN 30 UNIT/500ML 30-0.9/5 UT/ML-%
95 SOLUTION INTRAVENOUS ONCE AS NEEDED
Status: DISCONTINUED | OUTPATIENT
Start: 2025-07-30 | End: 2025-07-30 | Stop reason: HOSPADM

## 2025-07-30 RX ADMIN — AMPICILLIN SODIUM 2 G: 2 INJECTION, POWDER, FOR SOLUTION INTRAMUSCULAR; INTRAVENOUS at 03:07

## 2025-07-30 RX ADMIN — AMPICILLIN SODIUM 2 G: 2 INJECTION, POWDER, FOR SOLUTION INTRAMUSCULAR; INTRAVENOUS at 09:07

## 2025-07-30 RX ADMIN — IBUPROFEN 800 MG: 400 TABLET ORAL at 05:07

## 2025-07-30 RX ADMIN — LAMOTRIGINE 200 MG: 100 TABLET ORAL at 09:07

## 2025-07-30 RX ADMIN — ESCITALOPRAM OXALATE 20 MG: 10 TABLET ORAL at 09:07

## 2025-07-30 RX ADMIN — HYDROCODONE BITARTRATE AND ACETAMINOPHEN 1 TABLET: 5; 325 TABLET ORAL at 05:07

## 2025-07-30 RX ADMIN — PRENATAL VIT W/ FE FUMARATE-FA TAB 27-0.8 MG 1 TABLET: 27-0.8 TAB at 09:07

## 2025-07-30 NOTE — DISCHARGE SUMMARY
POSTPARTUM DISCHARGE SUMMARY    HOSPITAL COURSE   Admission Date: 2025   Discharge Date: 2025  Hospital Day: 3     Asher Saldana is a 29 y.o.  who presented to L&D on 2025 at 39w3d with complaint of painful, regular contractions.  While being observed on L&D she made change from closed to  over just a few hours.  Wanted to stay for augmentation of labor.  She declined a cervical balloon and was isaac too frequently for cytotec and so was started on pitocin.  Received an epidural around 0115.  Had SROM and was 3cm dilated at 0200hrs.  IUPC placed at 0630 due to pitocin up to 20mU/min and dysfunctional labor pattern.  No cervical change past 3cm over couple of hours so pitocin off and gave cytotec 50mcg po x 1.  Had great response to that.  Ultimately re-started pitocin though only up to 8mU/min and had  as below. Diagnosed with chorioamnionitis immediately postpartum based on maternal temp 102 and fetal and maternal tachycardia. She received gentamicin and ampicillin postpartum and no no new fevers since immediately after delivery.  Baby ultimately was transferred to [facility] in [facility].  Asher is doing very well postpartum and meets discharge criteria on PPD 1.     PREGNANCY & DELIVERY SUMMARY   Primary OB Doctor: [facility] patient (Dr. Coates / Dr. Cloud)    Antepartum complications:  GBS negative  Depression/Anxiety/Bipolar   EFRA II (4:00 colpo bx; 7.10.23); Pap (1.6.25): ASCUS, +other HRHPV, colposcopy PP     Primary OB Doctor: [facility] patient (Dr. Coates / Dr. Cloud)     Gestational Age @ Delivery:39w4d  G'sP's:       Delivery Type: spontaneous vaginal delivery     Date of Delivery: 2025  Time of Birth: hrs      Delivered By: Dr. Hillary Cloud  Anesthesia: epidural  Intrapartum complications: Chorioamnionitis (diagnosed immediately postpartum)  Quantitative Blood Loss: pending  Laceration / Repair: 1st degree repaired d/t bleeding (4-0  "vicryl on SH needle)  Placenta: spontaneous delivery, meconium-stained membranes and calcifications noted     Baby:   Liveborn female "Aye"  Apgars 5/8  Weight 3650g / 8lb 1oz     Feeding method: breast     Maternal Blood Type:         Lab Results   Component Value Date     GROUPTRH O POS 2025     INDIRECTCOOM NEG 2025      Maternal Rubella Immunity Status:           Lab Results   Component Value Date     RUBELLAIMMUN Reactive 2024        Subjective   Ambulating without difficulty: yes  Tolerating a normal diet: yes  Voiding normally: yes  GI: passing flatus and no BM yet  Pain management: managing with ibuprofen  Lochia: heavier than a normal period but not saturating pads and appropriate  Breastfeeding going well: has not started pumping yet  Denies chest pain, SOB, or leg pain.    Objective     Vital Signs (Most Recent):  Temp: 98.2 °F (36.8 °C) (25)  Pulse: 88 (25)  Resp: 17 (25)  BP: 106/71 (25)  SpO2: 97 % (25) Vital Signs (24h Range):  Temp:  [97.7 °F (36.5 °C)-102.4 °F (39.1 °C)] 98.2 °F (36.8 °C)  Pulse:  [] 88  Resp:  [16-20] 17  SpO2:  [77 %-100 %] 97 %  BP: ()/(47-84) 106/71     GEN = nad, alert/oriented, resting in bed, cheerful  BREASTS = deferred  PULM = normal respiratory efforts  ABD = soft, nontender, nondistended, fundus firm at U-1   = deferred  EXT = no CT    Recent Labs   Lab 25  1823 25  0656   WBC 9.73 15.23*   HGB 11.8* 11.2*   HCT 36.4* 33.9*    154   MCV 88 88     Assessment and Plan   29 y.o. yo , PPD 1 s/p spontaneous vaginal delivery at 39w4d.  Doing well and ready for discharge home.    routine postpartum care: encouraged ambulation, diet, pain control, and infant/self care  discharge home: today  postpartum care and expectations reviewed; return precautions reviewed; stressed importance of taking it easy for recovery, especially with travel / navigating hospital to see " "baby    Hillary Cloud MD    DISCHARGE MEDICATIONS   -- Prenatal Vitamin 1 tab once a day (take for six weeks postpartum or as long as you are breastfeeding)    -- Ibuprofen 800 mg by mouth three times a day with meals as needed for mild to moderate pain    -- Norco 5-325 mg 1 or 2 tablets by mouth every 4 hours as needed for moderate to severe pain (may cause constipation; don't drive while taking)    -- Senna-docusate 8.6-50 mg by mouth once a day as needed for constipation    -- Continue your Lexapro and Lamictal as previously prescribed.    PATIENT INSTRUCTIONS   -call or return for pain not controlled by your medications, heavy bleeding, problems with your stitches, fever, signs of postpartum depression or "baby blues"    -okay to shower but no bathing, swimming, or soaking for 6 weeks    -pelvic rest (no sex, no tampons, nothing in the vagina) for 6 weeks    -no strenuous activity or lifting >10lbs for 6 weeks    -someone from the office will contact you for scheduling your     -routine 6 week postpartum visit    Hillary Cloud MD   "

## 2025-07-30 NOTE — PLAN OF CARE
07/30/25 1016   Final Note   Assessment Type Final Discharge Note   Anticipated Discharge Disposition Home   What phone number can be called within the next 1-3 days to see how you are doing after discharge? [phone]   Post-Acute Status   Discharge Delays None known at this time     Discharge orders and chart reviewed with no further post-acute discharge needs identified at this time.  At this time, patient is cleared for discharge from Case Management standpoint.

## 2025-07-30 NOTE — DISCHARGE INSTRUCTIONS
FOLLOW UP WITH YOUR DOCTOR IN 6 WEEKS OR SOONER FOR ANY PROBLEMS.    Pelvic rest for 6 weeks (no sex, tampons, douching, nothing in the vagina)   You can experience vaginal bleeding on and off for up to 6 weeks, it will gradually get lighter and the color will change from bright red to a brownish discharge towards the end.     Activity:   NO strenuous activities or exercising. Do not /lift anything over 15 pounds. Do not do heavy housework or cleaning.   NO driving for 3 days. You may take short car trips but do not drive.   You may shower and/or soak in a bathtub, both are acceptable. Use a mild soap, no heavy perfumes or fragrances to avoid irritation.   If constipation develops: You may take Colace (stool softener), Milk of Magnesia, Dulcolax or Miralax. All of these medications are sold over the counter.     Care of Episiotomy:   Local agents such as Tucks pads & Dermoplast spray. You may also use a Sitz bath: sitting in a tub of warm water for 15 minutes 2-3 times per day will help relieve the discomfort.     Pain Relief:   You may take Motrin for mild pain & uterine cramping.     Emotional Changes:   You may experience baby blues after delivery. You may feel let down, anxious and cry easily. This is normal. These feelings can begin 2-3 days after delivery and usually disappear in about a week or two. Prolonged sadness may indicate postpartum depression.       ***SEEK IMMEDIATE HELP FROM THE EMERGENCY ROOM IF YOU HAVE ANY CHEST PAIN OR DIFFICULTY BREATHING.    Call your doctor for any of the following:   Problems with any of your medications, inability to eat.   Foul smelling vaginal discharge.   Temperature above 100.4.   Heavy vaginal bleeding. All women bleed different after delivery and each delivery is different. Heavy bleeding consists of saturating a nava pad in a 1 hour time period. Passing clots are normal, if you pass a blood clot larger than the size of a golf ball call your doctor's office.    If you experience pain in your legs/calves, if one leg increases in size and becomes swollen or becomes hot to touch or discolored.   Crying or periods of sadness beyond 2 weeks.     If you are breast feeding:   Wash your breasts with mild soap and warm water.   You should wear a supportive bra.   You should continue to take a prenatal vitamin for 6 weeks or until breastfeeding is discontinued.   If nipples are sore, apply a few drops of breast milk after nursing and let air dry or you can use Lanolin cream.   If breasts are engorged, apply warmth and express milk.   [facility] lactation consultant is available at [phone] for your breastfeeding needs.    If you are not breastfeeding:   Wear a tight bra and do not stimulate your breasts. Avoid handling your breasts and do not express milk. You may apply ice packs or cabbage leaves to relieve discomfort from engorgement. If your breasts become warm to touch, reddened or lumps develop call your doctor.

## 2025-07-30 NOTE — ANESTHESIA POSTPROCEDURE EVALUATION
Anesthesia Post Evaluation    Patient: Asher Saldana    Procedure(s) Performed: * No procedures listed *    Final Anesthesia Type: epidural      Patient location during evaluation: floor  Patient participation: Yes- Able to Participate  Level of consciousness: awake and alert  Post-procedure vital signs: reviewed and stable  Pain management: adequate  Airway patency: patent    PONV status at discharge: No PONV  Anesthetic complications: no      Cardiovascular status: blood pressure returned to baseline  Respiratory status: unassisted  Hydration status: euvolemic  Follow-up not needed.              Vitals Value Taken Time   /71 07/30/25 05:27   Temp 36.5 °C (97.7 °F) 07/30/25 05:27   Pulse 86 07/30/25 05:27   Resp 16 07/30/25 05:34   SpO2 95 % 07/30/25 05:27         No case tracking events are documented in the log.      Pain/Tanya Score: Pain Rating Prior to Med Admin: 8 (7/30/2025  5:34 AM)

## 2025-07-30 NOTE — PROGRESS NOTES
Pharmacokinetic Initial Assessment: Gentamicin    Assessment:  Weight utilized for dose calculation: Adjusted Body Weight  Dosing method utilized: Dosing by random level    Plan: Extended interval dosing regimen: Gentamicin 304.4 mg IV once, followed by a random level to be drawn on 7/30 at 0900, 8-12 hours after the first dose.    Pharmacy will continue to monitor.    Please contact pharmacy at extension [phone] with any questions regarding this assessment.    Thank you for the consult,    Miguelina Meghann       Patient brief summary:  Asher Saldana is a 29 y.o. female initiated on aminoglycoside therapy for treatment of suspected chorioamnionitis.    Drug Allergies:   Review of patient's allergies indicates:  No Known Allergies    Actual Body Weight:   83.9 kg    Adjust Body Weight:   60.9 kg    Ideal Body Weight:  45.5 kg    Renal Function:   CrCl cannot be calculated (Patient's most recent lab result is older than the maximum 7 days allowed.).,     Dialysis Method (if applicable):  N/A    CBC (last 72 hours):  Recent Labs   Lab Result Units 07/28/25  1823   WBC K/uL 9.73   Hgb gm/dL 11.8*   Hct % 36.4*   Platelet Count K/uL 212   Pettis % % 8.2   Eos % % 0.6   Basophil % % 0.3       Metabolic Panel (last 72 hours):  Recent Labs   Lab Result Units 07/28/25  1823   Urine Creatinine mg/dL 66.6  66.6  66.6       Microbiologic Results:  Microbiology Results (last 7 days)       ** No results found for the last 168 hours. **

## 2025-07-30 NOTE — PROGRESS NOTES
07/29/25 2030   Vital Signs   Temp (!) 102.4 °F (39.1 °C)      Immediate post delivery temp. Dr. Cloud notified. Patient will be treated for Chorioamnionitis per Md..

## 2025-07-30 NOTE — PROGRESS NOTES
Asher Saldana [id number] is a 29 y.o. female who has been consulted for gentamycin dosing.    Pharmacy consult for gentamycin dosing in no longer required.  Gentamycin was discontinued.    Thank you for allowing us to participate in this patient's care.     Manjula Garcia, PharmD

## 2025-07-30 NOTE — PLAN OF CARE
[facility]  Initial Discharge Assessment       Primary Care Provider: Sandip Chaudhary MD    Admission Diagnosis: Supervision of high-risk pregnancy [O09.90]    Admission Date: 2025  Expected Discharge Date: 2025    Pt is a 29-year-old female who arrived from home with Supervision of high risk pregnancy, antepartum. Information verified as correct on facesheet. The assessment was completed at the patient's bedside.  Pt delivered a baby but  was transported to [facility] due to possible seizure. Pt lives with spouse. Pt does not have a Living Will or Advance Directive. The Pt is oriented to person, places, and times. PCP last seen early this year; Pt is being followed by OBGYN.  Pt denies Coumadin, dialysis, DME, HH, and has not been readmitted into the hospital in the last thirty days.  Pt is capable of performing ADLs without assistance. Pt drives to and from medical appointments. Pt reports she takes medication as prescribed; pharmacy used [facility].  Pt verbalized plan to discharge home via family transport. Pt has no other needs to be addressed at this time. CM reviewed the chart and will continue to monitor.       Transition of Care Barriers: None    Payor: BLUE CROSS BLUE SHIELD / Plan: BCBS ALL OUT OF STATE / Product Type: PPO /     Extended Emergency Contact Information  Primary Emergency Contact: Avery Saldana  Address: [address]           [place] [address] [facility]  Mobile Phone: [phone]  Relation: Significant other  Secondary Emergency Contact: Jana Castillo  Mobile Phone: [phone]  Relation: Mother    Discharge Plan A: Home with family  Discharge Plan B: Home      [facility] - [place], [place] - [address]  [address]  [place] [address]  Phone: [phone] Fax: [phone]    [facility]  [address]  [facility] [address]  Phone: [phone] Fax:  [phone]      Initial Assessment (most recent)       Adult Discharge Assessment - 25 1009          Discharge Assessment    Assessment Type Discharge Planning Assessment     Confirmed/corrected address, phone number and insurance Yes     Confirmed Demographics Correct on Facesheet     Source of Information patient     Communicated AGGIE with patient/caregiver Yes     People in Home spouse;child(edgar), dependent     Name(s) of People in Home Avery Saldana, mom, and Aye Saldana ()     Facility Arrived From: home     Do you expect to return to your current living situation? Yes     Do you have help at home or someone to help you manage your care at home? Yes     Who are your caregiver(s) and their phone number(s)? Avery Shana     Prior to hospitilization cognitive status: Alert/Oriented     Current cognitive status: Alert/Oriented     Walking or Climbing Stairs Difficulty no     Dressing/Bathing Difficulty no     Home Accessibility not wheelchair accessible     Equipment Currently Used at Home none     Readmission within 30 days? No     Patient currently being followed by outpatient case management? No     Do you currently have service(s) that help you manage your care at home? No     Do you take prescription medications? No     Do you have prescription coverage? Yes     Do you have any problems affording any of your prescribed medications? No     Is the patient taking medications as prescribed? no     If no, which medications is patient not taking? Pt was only prescribed prenatal vitamins     Who is going to help you get home at discharge? Avery Saldana/spouse [phone]     How do you get to doctors appointments? car, drives self     Are you on dialysis? No     Do you take coumadin? No     Discharge Plan A Home with family     Discharge Plan B Home     DME Needed Upon Discharge  none     Discharge Plan discussed with: Patient     Transition of Care Barriers None

## 2025-07-30 NOTE — PLAN OF CARE
Problem: Adult Inpatient Plan of Care  Goal: Plan of Care Review  Outcome: Progressing  Goal: Patient-Specific Goal (Individualized)  Outcome: Progressing  Goal: Absence of Hospital-Acquired Illness or Injury  Outcome: Progressing  Goal: Optimal Comfort and Wellbeing  Outcome: Progressing  Goal: Readiness for Transition of Care  Outcome: Progressing     Problem:  Fall Injury Risk  Goal: Absence of Fall, Infant Drop and Related Injury  Outcome: Progressing     Problem: Skin Injury Risk Increased  Goal: Skin Health and Integrity  Outcome: Progressing       0000: Patient able to ambulate to the restroom. Unable to void at this time. Normal lochia noted. Rn will continue to monitor.

## 2025-07-30 NOTE — PROGRESS NOTES
07/29/25 2100   Vital Signs   Temp (!) 101.4 °F (38.6 °C)   Temp Source Oral   Resp 20      Dr. Cloud notified about temp. New orders received.

## 2025-07-30 NOTE — PLAN OF CARE
Patient appropriate for discharge. VSS. Voiding well. Ambulating independently. Fundus firm without massage and @umbilicus. Bleeding is light.     Problem: Adult Inpatient Plan of Care  Goal: Plan of Care Review  Outcome: Met  Goal: Patient-Specific Goal (Individualized)  Outcome: Met  Goal: Absence of Hospital-Acquired Illness or Injury  Outcome: Met  Goal: Optimal Comfort and Wellbeing  Outcome: Met  Goal: Readiness for Transition of Care  Outcome: Met     Problem:  Fall Injury Risk  Goal: Absence of Fall, Infant Drop and Related Injury  Outcome: Met     Problem: Labor  Goal: Absence of Infection Signs and Symptoms  Outcome: Met     Problem: Skin Injury Risk Increased  Goal: Skin Health and Integrity  Outcome: Met

## 2025-07-30 NOTE — L&D DELIVERY NOTE
"VAGINAL DELIVERY NOTE  2025    HOSPITAL COURSE   Asher Saldana is a 29 y.o.  who presented to L&D on 2025 at 39w3d with complaint of painful, regular contractions.  While being observed on L&D she made change from closed to  over just a few hours.  Wanted to stay for augmentation of labor.  She declined a cervical balloon and was isaac too frequently for cytotec and so was started on pitocin.  Received an epidural around 0115.  Had SROM and was 3cm dilated at 0200hrs.  IUPC placed at 0630 due to pitocin up to 20mU/min and dysfunctional labor pattern.  No cervical change past 3cm over couple of hours so pitocin off and gave cytotec 50mcg po x 1.  Had great response to that.  Ultimately re-started pitocin though only up to 8mU/min and had  as below. Diagnosed with chorioamnionitis immediately postpartum based on maternal temp 102 and fetal and maternal tachycardia.     PREGNANCY & DELIVERY SUMMARY   Antepartum complications:  GBS negative  Depression/Anxiety/Bipolar   EFRA II (4:00 colpo bx; 7.10.23); Pap (1.6.25): ASCUS, +other HRHPV, colposcopy PP    Primary OB Doctor: [facility] (Dr. Coates / Dr. Cloud)    Gestational Age @ Delivery:39w4d  G'sP's:      Delivery Type: spontaneous vaginal delivery    Date of Delivery: 2025  Time of Birth: hrs     Delivered By: Dr. Hillary Cloud  Anesthesia: epidural  Intrapartum complications: Chorioamnionitis (diagnosed immediately postpartum)  Quantitative Blood Loss: pending  Laceration / Repair: 1st degree repaired d/t bleeding (4-0 vicryl on SH needle)  Placenta: spontaneous delivery, meconium-stained membranes and calcifications noted    Baby:   Liveborn female "Aye"  Apgars pending  Weight pending    Feeding method: breast    Maternal Blood Type:   Lab Results   Component Value Date    GROUPTRH O POS 2025    INDIRECTCOOM NEG 2025      Maternal Rubella Immunity Status:     Lab Results   Component " Value Date    RUBELLAIMMUN Reactive 12/07/2024        Pushing for: ~1.75hrs  Episiotomy: no  Position at delivery: OA  Anterior shoulder: left  Nuchal cord: no  Amniotic fluid: terminal meconium  Cord gases: sent (results below)  Delayed cord clamping x 60 sec: yes  Placenta: spontaneous delivery, meconium-stained membranes and calcifications noted  Pitocin given: yes with placenta delivery  Other uterotonics: methergine 0.2mg IM x 1  Vaginal sweep: negative for retained foreign object  Sponge / instrument counts: correct  Specimens: placenta  Complications:  chorioamnionitis  Maternal Condition: stable  Infant Condition: to NICU for monitoring    Comments: steady fetal descent throughout 2nd stage, maternal and fetal tachycardia developed during 2nd stage, maternal fever noted immediately postpartum    Component      Latest Ref Rng 7/29/2025   POC PH      7.25 - 7.45  7.208 (L)    POC PCO2      27 - 49 mmHg 41.6    POC PO2      17 - 41 mmHg 25    POC HCO3      12 - 29 mmol/L 16.5    POC BE      <=-9 mmol/L -11    POC SATURATED O2      % 33       Hillary Cloud MD

## 2025-07-31 ENCOUNTER — PATIENT OUTREACH (OUTPATIENT)
Dept: ADMINISTRATIVE | Facility: CLINIC | Age: 30
End: 2025-07-31
Payer: COMMERCIAL

## 2025-07-31 NOTE — PROGRESS NOTES
No outreaches to be attempted for this encounter only as the patient is non-applicable for a TCC post-discharge follow up call due to being admitted for childbirth.

## 2025-08-11 ENCOUNTER — OFFICE VISIT (OUTPATIENT)
Dept: PSYCHIATRY | Facility: CLINIC | Age: 30
End: 2025-08-11
Payer: COMMERCIAL

## 2025-08-11 DIAGNOSIS — F31.70 BIPOLAR AFFECTIVE DISORDER IN REMISSION: Primary | ICD-10-CM

## 2025-08-11 DIAGNOSIS — F40.10 SOCIAL ANXIETY DISORDER: ICD-10-CM

## 2025-08-11 DIAGNOSIS — F41.1 GAD (GENERALIZED ANXIETY DISORDER): ICD-10-CM

## 2025-08-11 PROCEDURE — 1159F MED LIST DOCD IN RCRD: CPT | Mod: CPTII,95,,

## 2025-08-11 PROCEDURE — 98007 SYNCH AUDIO-VIDEO EST HI 40: CPT | Mod: 95,,,

## 2025-08-11 PROCEDURE — 1160F RVW MEDS BY RX/DR IN RCRD: CPT | Mod: CPTII,95,,
